# Patient Record
Sex: MALE | Race: WHITE | NOT HISPANIC OR LATINO | Employment: FULL TIME | URBAN - METROPOLITAN AREA
[De-identification: names, ages, dates, MRNs, and addresses within clinical notes are randomized per-mention and may not be internally consistent; named-entity substitution may affect disease eponyms.]

---

## 2017-06-28 ENCOUNTER — OFFICE VISIT (OUTPATIENT)
Dept: FAMILY MEDICINE | Facility: CLINIC | Age: 37
End: 2017-06-28
Payer: COMMERCIAL

## 2017-06-28 VITALS
HEIGHT: 72 IN | SYSTOLIC BLOOD PRESSURE: 137 MMHG | HEART RATE: 73 BPM | DIASTOLIC BLOOD PRESSURE: 82 MMHG | BODY MASS INDEX: 34.95 KG/M2 | OXYGEN SATURATION: 96 % | TEMPERATURE: 98.4 F | WEIGHT: 258 LBS

## 2017-06-28 DIAGNOSIS — S29.012A RHOMBOID MUSCLE STRAIN, INITIAL ENCOUNTER: Primary | ICD-10-CM

## 2017-06-28 PROCEDURE — 99213 OFFICE O/P EST LOW 20 MIN: CPT | Performed by: FAMILY MEDICINE

## 2017-06-28 NOTE — MR AVS SNAPSHOT
After Visit Summary   6/28/2017    Tony Ortega    MRN: [id number]           Patient Information     Date Of Birth          1980        Visit Information        Provider Department      6/28/2017 2:00 PM Sri Granados MD [facility]        Today's Diagnoses     Rhomboid muscle strain, initial encounter    -  1      Care Instructions    Based on your medical history and these are the current health maintenance or preventive care services that you are due for (some may have been done at this visit)  There are no preventive care reminders to display for this patient.      At [facility], we strive to deliver an exceptional experience to you, every time we see you.    If you receive a survey in the mail, please send us back your thoughts. We really do value your feedback.    Your care team's suggested websites for health information:  Www.[facility].org : Up to date and easily searchable information on multiple topics.  Www.medlineplus.gov : medication info, interactive tutorials, watch real surgeries online  Www.familydoctor.org : good info from the Academy of Family Physicians  Www.cdc.gov : public health info, travel advisories, epidemics (H1N1)  Www.aap.org : children's health info, normal development, vaccinations  Www.health.[facility].[place].us : [place] dept of health, public health issues in [place], N1N1    How to contact your care team:   Karsten Clemente/Kole [phone]         Pharmacy [phone]    Dr. May, Nancy Cruz PA-C, Dr. Carranza, Lynn Hawley APRN CNP, Haleigh Hutchinson PA-C, Dr. Hendricks, and GENE Multani CNP    Team RNs: Mai & Renee      Clinic hours  M-Th 7 am-7 pm   Fri 7 am-5 pm.   Urgent care M-F 11 am-9 pm,   Sat/Sun 9 am-5 pm.  Pharmacy M-Th 8 am-8 pm Fri 8 am-6 pm  Sat/Sun 9 am-5 pm.     All password changes, disabled accounts, or ID changes in MyChart/MyHealth will be done by our Access Services  Department.    If you need help with your account or password, call: [phone]. Clinic staff no longer has the ability to change passwords.               Follow-ups after your visit        Who to contact     If you have questions or need follow up information about today's clinic visit or your schedule please contact [facility] directly at [phone].  Normal or non-critical lab and imaging results will be communicated to you by LiveNinjahart, letter or phone within 4 business days after the clinic has received the results. If you do not hear from us within 7 days, please contact the clinic through Lazy Angelt or phone. If you have a critical or abnormal lab result, we will notify you by phone as soon as possible.  Submit refill requests through sendwithus or call your pharmacy and they will forward the refill request to us. Please allow 3 business days for your refill to be completed.          Additional Information About Your Visit        LiveNinjaharDroidhen Information     sendwithus gives you secure access to your electronic health record. If you see a primary care provider, you can also send messages to your care team and make appointments. If you have questions, please call your primary care clinic.  If you do not have a primary care provider, please call [phone] and they will assist you.        Care EveryWhere ID     This is your Care EveryWhere ID. This could be used by other organizations to access your [place] medical records  [id number]        Your Vitals Were     Pulse Temperature Height Pulse Oximetry BMI (Body Mass Index)       73 98.4  F (36.9  C) (Oral) 6' (1.829 m) 96% 34.99 kg/m2        Blood Pressure from Last 3 Encounters:   06/28/17 137/82   09/16/15 134/82   04/23/14 129/81    Weight from Last 3 Encounters:   06/28/17 258 lb (117 kg)   09/16/15 240 lb 12.8 oz (109.2 kg)   04/23/14 232 lb (105.2 kg)              Today, you had the following     No orders found for display         Today's  Medication Changes          These changes are accurate as of: 6/28/17  3:16 PM.  If you have any questions, ask your nurse or doctor.               Start taking these medicines.        Dose/Directions    tiZANidine 4 MG tablet   Commonly known as:  ZANAFLEX   Used for:  Rhomboid muscle strain, initial encounter   Started by:  Sri Granados MD        Dose:  4-8 mg   Take 1-2 tablets (4-8 mg) by mouth 3 times daily as needed for muscle spasms   Quantity:  30 tablet   Refills:  1            Where to get your medicines      These [place] [facility] [place] [address]  [address], [facility] [address]     Phone:  [phone]     tiZANidine 4 MG tablet                Primary Care Provider Office Phone # Fax #    Sri Singer [phone]       [facility] [address]  [facility] [address]        Equal Access to Services     [facility] Foundation HospitalLUCINDA : Hadii aad ku hadasho Soomaali, waaxda luqadaha, qaybta kaalmada adeegyada, waxay idiin haysalma ramos . So [facility] [phone].    ATENCIÓN: Si habla español, tiene a orozco disposición servicios gratuitos de asistencia lingüística. Llame al [phone].    We comply with applicable federal civil rights laws and [place] laws. We do not discriminate on the basis of race, color, national origin, age, disability sex, sexual orientation or gender identity.            Thank you!     Thank you for choosing [facility]  for your care. Our goal is always to provide you with excellent care. Hearing back from our patients is one way we can continue to improve our services. Please take a few minutes to complete the written survey that you may receive in the mail after your visit with us. Thank you!             Your Updated Medication List - Protect others around you: Learn how to safely use, store and throw away your medicines at  www.disposemymeds.org.          This list is accurate as of: 6/28/17  3:16 PM.  Always use your most recent med list.                   Brand Name Dispense Instructions for use Diagnosis    tiZANidine 4 MG tablet    ZANAFLEX    30 tablet    Take 1-2 tablets (4-8 mg) by mouth 3 times daily as needed for muscle spasms    Rhomboid muscle strain, initial encounter

## 2017-06-28 NOTE — PROGRESS NOTES
SUBJECTIVE:                                                    Tony Ortega is a 37 year old male who presents to clinic today for the following health issues:      Joint Pain    Onset: 2 weeks ago    Description:   Location: upper back by shoulder blade  Character: Dull ache    Intensity: mild, 1/10    Progression of Symptoms: intermittent    Accompanying Signs & Symptoms:  Other symptoms: none    History:   Previous similar pain: no    Precipitating factors:   Trauma or overuse: no     Alleviating factors:  Improved by: nothing    Therapies Tried and outcome: na    Can wake from sleep.  Currently works doing desk work.  No recent exercise and no weight lifting.    Problem list and histories reviewed & adjusted, as indicated.  Additional history: as documented    Patient Active Problem List   Diagnosis     Lipid screening     Overweight     Screening for diabetes mellitus     Past Surgical History:   Procedure Laterality Date     EYE SURGERY  2013/04    lasik       Social History   Substance Use Topics     Smoking status: Former Smoker     Packs/day: 1.50     Years: 10.00     Types: Cigarettes     Start date: 8/1/2000     Quit date: 3/9/2012     Smokeless tobacco: Never Used     Alcohol use Yes      Comment: Occasionally     Family History   Problem Relation Age of Onset     DIABETES Paternal Grandfather      Hypertension Paternal Grandfather      DIABETES Maternal Grandfather      Coronary Artery Disease Maternal Grandfather      Hypertension Maternal Grandfather      Colon Cancer Maternal Grandfather      Thyroid Disease Father      Doctor thinks it related to a traumatic headinjury     Hyperlipidemia No family hx of      CEREBROVASCULAR DISEASE No family hx of      Prostate Cancer No family hx of      Depression No family hx of      Anxiety Disorder No family hx of      Thyroid Disease No family hx of      Asthma No family hx of            Reviewed and updated as needed this visit by clinical staff  Tobacco   Allergies  Meds  Med Hx  Surg Hx  Fam Hx  Soc Hx      Reviewed and updated as needed this visit by Provider         ROS:  Constitutional, HEENT, cardiovascular, pulmonary, gi and gu systems are negative, except as otherwise noted.    OBJECTIVE:     /82 (BP Location: Right arm, Patient Position: Chair, Cuff Size: Adult Large)  Pulse 73  Temp 98.4  F (36.9  C) (Oral)  Ht 6' (1.829 m)  Wt 258 lb (117 kg)  SpO2 96%  BMI 34.99 kg/m2  Body mass index is 34.99 kg/(m^2).  GENERAL: healthy, alert and no distress  NECK: no adenopathy, no asymmetry, masses, or scars and thyroid normal to palpation  RESP: lungs clear to auscultation - no rales, rhonchi or wheezes  CV: regular rate and rhythm, normal S1 S2, no S3 or S4, no murmur, click or rub, no peripheral edema and peripheral pulses strong  ABDOMEN: soft, nontender, no hepatosplenomegaly, no masses and bowel sounds normal  MS: right rhomboid muscle tightness  NEURO: Normal strength and tone, mentation intact and speech normal    Diagnostic Test Results:  none     ASSESSMENT/PLAN:     1. Rhomboid muscle strain, initial encounter  Heat, massage and muscle relaxer.  - tiZANidine (ZANAFLEX) 4 MG tablet; Take 1-2 tablets (4-8 mg) by mouth 3 times daily as needed for muscle spasms  Dispense: 30 tablet; Refill: 1    The uses and side effects, including black box warnings as appropriate, were discussed in detail.  All patient questions were answered.  The patient was instructed to call immediately if any side effects developed.     FUTURE APPOINTMENTS:       - Follow-up visit in 1 - 2 weeks if not improved.    Sri Singer MD  [facility]

## 2017-06-28 NOTE — NURSING NOTE
Chief Complaint   Patient presents with     Musculoskeletal Problem       Initial /82 (BP Location: Right arm, Patient Position: Chair, Cuff Size: Adult Large)  Pulse 73  Temp 98.4  F (36.9  C) (Oral)  Ht 6' (1.829 m)  Wt 258 lb (117 kg)  SpO2 96%  BMI 34.99 kg/m2 Estimated body mass index is 34.99 kg/(m^2) as calculated from the following:    Height as of this encounter: 6' (1.829 m).    Weight as of this encounter: 258 lb (117 kg).  Medication Reconciliation: complete   An,CMA (AMAA)

## 2017-06-28 NOTE — PATIENT INSTRUCTIONS
Based on your medical history and these are the current health maintenance or preventive care services that you are due for (some may have been done at this visit)  There are no preventive care reminders to display for this patient.      At [facility], we strive to deliver an exceptional experience to you, every time we see you.    If you receive a survey in the mail, please send us back your thoughts. We really do value your feedback.    Your care team's suggested websites for health information:  Www.[place].org : Up to date and easily searchable information on multiple topics.  Www.medlineplus.gov : medication info, interactive tutorials, watch real surgeries online  Www.familydoctor.org : good info from the Academy of Family Physicians  Www.cdc.gov : public health info, travel advisories, epidemics (H1N1)  Www.aap.org : children's health info, normal development, vaccinations  Www.health.[facility].[place].us : [place] dept of health, public health issues in [place], N1N1    How to contact your care team:   Team Chyna/Spirit [phone]         Pharmacy [phone]    Dr. May, Nancy Cruz PA-C, Dr. Carranza, Lynn MILLS CNP, Haleigh Hutchinson PA-C, Dr. Hendricks, and GENE Multani CNP    Team RNs: Mai Duran      Clinic hours  M-Th 7 am-7 pm   Fri 7 am-5 pm.   Urgent care M-F 11 am-9 pm,   Sat/Sun 9 am-5 pm.  Pharmacy M-Th 8 am-8 pm Fri 8 am-6 pm  Sat/Sun 9 am-5 pm.     All password changes, disabled accounts, or ID changes in [employer]/MyHealth will be done by our Access Services Department.    If you need help with your account or password, call: [phone]. Clinic staff no longer has the ability to change passwords.

## 2018-05-17 ENCOUNTER — OFFICE VISIT (OUTPATIENT)
Dept: URGENT CARE | Facility: URGENT CARE | Age: 38
End: 2018-05-17
Payer: COMMERCIAL

## 2018-05-17 ENCOUNTER — RADIANT APPOINTMENT (OUTPATIENT)
Dept: GENERAL RADIOLOGY | Facility: CLINIC | Age: 38
End: 2018-05-17
Attending: PHYSICIAN ASSISTANT
Payer: COMMERCIAL

## 2018-05-17 VITALS
DIASTOLIC BLOOD PRESSURE: 91 MMHG | TEMPERATURE: 98.5 F | SYSTOLIC BLOOD PRESSURE: 165 MMHG | WEIGHT: 251.4 LBS | OXYGEN SATURATION: 97 % | HEART RATE: 65 BPM | BODY MASS INDEX: 34.1 KG/M2

## 2018-05-17 DIAGNOSIS — M25.532 LEFT WRIST PAIN: ICD-10-CM

## 2018-05-17 DIAGNOSIS — S60.212A CONTUSION OF LEFT WRIST, INITIAL ENCOUNTER: ICD-10-CM

## 2018-05-17 DIAGNOSIS — M25.532 LEFT WRIST PAIN: Primary | ICD-10-CM

## 2018-05-17 PROCEDURE — 99213 OFFICE O/P EST LOW 20 MIN: CPT | Performed by: PHYSICIAN ASSISTANT

## 2018-05-17 PROCEDURE — 73110 X-RAY EXAM OF WRIST: CPT | Mod: LT

## 2018-05-17 ASSESSMENT — ENCOUNTER SYMPTOMS
MYALGIAS: 1
NECK STIFFNESS: 0
DIAPHORESIS: 0
COUGH: 0
ARTHRALGIAS: 1
CHILLS: 0
RHINORRHEA: 0
SORE THROAT: 0
DIZZINESS: 0
VOMITING: 0
NECK PAIN: 0
SHORTNESS OF BREATH: 0
HEADACHES: 0
NAUSEA: 0
LIGHT-HEADEDNESS: 0
DIARRHEA: 0
WOUND: 0
FEVER: 0
WEAKNESS: 0
ADENOPATHY: 0
BRUISES/BLEEDS EASILY: 0

## 2018-05-17 NOTE — MR AVS SNAPSHOT
After Visit Summary   5/17/2018    Tony Ortega    MRN: [id number]           Patient Information     Date Of Birth          1980        Visit Information        Provider Department      5/17/2018 4:30 PM Jaleel Gary PA-C [facility]        Today's Diagnoses     Left wrist pain    -  1    Contusion of left wrist, initial encounter           Follow-ups after your visit        Who to contact     If you have questions or need follow up information about today's clinic visit or your schedule please contact [facility] directly at [phone].  Normal or non-critical lab and imaging results will be communicated to you by Biarthart, letter or phone within 4 business days after the clinic has received the results. If you do not hear from us within 7 days, please contact the clinic through Biarthart or phone. If you have a critical or abnormal lab result, we will notify you by phone as soon as possible.  Submit refill requests through Molcure or call your pharmacy and they will forward the refill request to us. Please allow 3 business days for your refill to be completed.          Additional Information About Your Visit        MyChart Information     Molcure gives you secure access to your electronic health record. If you see a primary care provider, you can also send messages to your care team and make appointments. If you have questions, please call your primary care clinic.  If you do not have a primary care provider, please call [phone] and they will assist you.        Care EveryWhere ID     This is your Care EveryWhere ID. This could be used by other organizations to access your [place] medical records  [id number]        Your Vitals Were     Pulse Temperature Pulse Oximetry BMI (Body Mass Index)          65 98.5  F (36.9  C) (Oral) 97% 34.1 kg/m2         Blood Pressure from Last 3 Encounters:   05/17/18 (!) 165/91   06/28/17 137/82   09/16/15  134/82    Weight from Last 3 Encounters:   05/17/18 251 lb 6.4 oz (114 kg)   06/28/17 258 lb (117 kg)   09/16/15 240 lb 12.8 oz (109.2 kg)               Primary Care Provider Office Phone # Fax [place] Tevin Singer [phone]       [address]  [facility] [address]        Equal Access to Services     PASQUALE DE LA ROSA : Hadii aad ku hadasho Soomaali, waaxda luqadaha, qaybta kaalmada adeegyada, waxay idiin hayaan adeeg kharash la'aan . So [facility] [phone].    ATENCIÓN: Si habla español, tiene a orozco disposición servicios gratuitos de asistencia lingüística. [facility] [phone].    We comply with applicable federal civil rights laws and [place] laws. We do not discriminate on the basis of race, color, national origin, age, disability, sex, sexual orientation, or gender identity.            Thank you!     Thank you for choosing [facility]  for your care. Our goal is always to provide you with excellent care. Hearing back from our patients is one way we can continue to improve our services. Please take a few minutes to complete the written survey that you may receive in the mail after your visit with us. Thank you!             Your Updated Medication List - Protect others around you: Learn how to safely use, store and throw away your medicines at www.disposemymeds.org.          This list is accurate as of 5/17/18  6:05 PM.  Always use your most recent med list.                   Brand Name Dispense Instructions for use Diagnosis    tiZANidine 4 MG tablet    ZANAFLEX    30 tablet    Take 1-2 tablets (4-8 mg) by mouth 3 times daily as needed for muscle spasms    Rhomboid muscle strain, initial encounter

## 2018-05-17 NOTE — PROGRESS NOTES
Chief Complaint:    Chief Complaint   Patient presents with     Pain     Patient complains of pain in left wrist and arm        HPI:    Tony Ortega is a 38 year old male who sustained a left wrist injury 1 days ago. Mechanism of injury: Patient was doing some housework and felt a sharp pain on the palmar side of the L wrist.  This morning he woke up and noticed some bruising in this area.  Symptoms have been waxing and waning since that time. Prior history of related problems: no prior problems with this area in the past.    ROS:     Review of Systems   Constitutional: Negative for chills, diaphoresis and fever.   HENT: Negative for congestion, ear pain, rhinorrhea and sore throat.    Respiratory: Negative for cough and shortness of breath.    Gastrointestinal: Negative for diarrhea, nausea and vomiting.   Musculoskeletal: Positive for arthralgias and myalgias. Negative for neck pain and neck stiffness.   Skin: Negative for wound.   Allergic/Immunologic: Negative for immunocompromised state.   Neurological: Negative for dizziness, weakness, light-headedness and headaches.   Hematological: Negative for adenopathy. Does not bruise/bleed easily.         Family History   Family History   Problem Relation Age of Onset     DIABETES Paternal Grandfather      Hypertension Paternal Grandfather      DIABETES Maternal Grandfather      Coronary Artery Disease Maternal Grandfather      Hypertension Maternal Grandfather      Colon Cancer Maternal Grandfather      Thyroid Disease Father      Doctor thinks it related to a traumatic headinjury     Hyperlipidemia No family hx of      CEREBROVASCULAR DISEASE No family hx of      Prostate Cancer No family hx of      Depression No family hx of      Anxiety Disorder No family hx of      Thyroid Disease No family hx of      Asthma No family hx of         Problem history  Patient Active Problem List   Diagnosis     Lipid screening     Overweight     Screening for diabetes mellitus         Allergies  No Known Allergies     Social History  Social History     Social History     Marital status: Single     Spouse name: N/A     Number of children: N/A     Years of education: N/A     Occupational History     Not on file.     Social History Main Topics     Smoking status: Former Smoker     Packs/day: 1.50     Years: 10.00     Types: Cigarettes     Start date: 8/1/2000     Quit date: 3/9/2012     Smokeless tobacco: Never Used     Alcohol use Yes      Comment: Occasionally     Drug use: No     Sexual activity: Yes     Partners: Female     Birth control/ protection: None     Other Topics Concern     Parent/Sibling W/ Cabg, Mi Or Angioplasty Before 65f 55m? No     Social History Narrative        Current Meds    Current Outpatient Prescriptions:      tiZANidine (ZANAFLEX) 4 MG tablet, Take 1-2 tablets (4-8 mg) by mouth 3 times daily as needed for muscle spasms (Patient not taking: Reported on 5/17/2018), Disp: 30 tablet, Rfl: 1     Physical Exam:     Vital signs were reviewed and noted by me.  BP (!) 165/91 (BP Location: Left arm, Patient Position: Chair, Cuff Size: Adult Regular)  Pulse 65  Temp 98.5  F (36.9  C) (Oral)  Wt 251 lb 6.4 oz (114 kg)  SpO2 97%  BMI 34.1 kg/m2    General appearance: healthy, alert and no distress  Ears: R TM - normal: no effusions, no erythema, and normal landmarks, L TM - normal: no effusions, no erythema, and normal landmarks  Eyes: R normal, L normal  Nose: normal  Oropharynx: normal  Neck: supple and no adenopathy  Lungs: normal and clear to auscultation  Heart: S1, S2 normal, no murmur, click, rub or gallop, regular rate and rhythm  Hand and wrist exam: soft tissue tenderness and swelling at the palmar side of the wrist, 2 cm in diameter area of ecchymosis, radial pulse normal, sensation normal.  Full range of motion of wrist and all digits.  Wrist strength 5/5, digits neurologically intact.      X-ray per radiology read:     WRIST LEFT THREE OR MORE VIEWS     5/17/2018 5:20 PM      HISTORY: Left wrist bruising. No acute injury. Left wrist pain.     COMPARISON: None.         IMPRESSION: No evidence of acute fracture or subluxation.     MAIY SALTER MD     ASSESSMENT:     (M25.532) Left wrist pain  (primary encounter diagnosis)  Plan: XR Wrist Left G/E 3 Views    (S60.493A) Contusion of left wrist, initial encounter     PLAN:  Discussed imaging with patient.  XR of the wrist was negative for any acute fracture.  Advised ice to the area.  Ibuprofen for comfort.  Patient given ace wrap for support.  Patient instructed to follow up with his PCP in 2 weeks if symptoms have not resolved.  Earlier if symptoms worsen.  Patient verbalized understanding and agreed with this plan.     Jaleel Gary  5/17/2018, 4:45 PM

## 2018-05-29 ENCOUNTER — OFFICE VISIT (OUTPATIENT)
Dept: FAMILY MEDICINE | Facility: CLINIC | Age: 38
End: 2018-05-29
Payer: COMMERCIAL

## 2018-05-29 VITALS
TEMPERATURE: 98.5 F | OXYGEN SATURATION: 97 % | BODY MASS INDEX: 34.27 KG/M2 | HEART RATE: 65 BPM | DIASTOLIC BLOOD PRESSURE: 86 MMHG | HEIGHT: 72 IN | WEIGHT: 253 LBS | SYSTOLIC BLOOD PRESSURE: 138 MMHG

## 2018-05-29 DIAGNOSIS — M54.2 CERVICALGIA: ICD-10-CM

## 2018-05-29 DIAGNOSIS — M89.8X1 PAIN OF LEFT SCAPULA: Primary | ICD-10-CM

## 2018-05-29 DIAGNOSIS — M79.602 PAIN OF LEFT UPPER EXTREMITY: ICD-10-CM

## 2018-05-29 PROCEDURE — 99207 ZZC FOR CODING REVIEW: CPT | Performed by: FAMILY MEDICINE

## 2018-05-29 PROCEDURE — 99214 OFFICE O/P EST MOD 30 MIN: CPT | Performed by: FAMILY MEDICINE

## 2018-05-29 ASSESSMENT — PAIN SCALES - GENERAL: PAINLEVEL: MILD PAIN (3)

## 2018-05-29 NOTE — PROGRESS NOTES
"  SUBJECTIVE:   Tony Ortega is a 38 year old male who presents to clinic today for the following health issues:      Joint Pain    Onset: about 2 weeks ago (day or so after his wrist injury on 5/16/18, injured when doing housework, seen at )    Description:   Location: shoulder blade, into left shoulder, down arm, and into left wrist  Character: Dull ache  \"knot\" at base of shoulder blade that is not painful (massaging actually relives discomfort/feels good)  Slight left-sided neck pain  Majority of pain in upper left arm    Intensity: moderate    Progression of Symptoms: worse    Accompanying Signs & Symptoms:  Other symptoms: radiation of pain to elbow    History:   Previous similar pain: no       Precipitating factors:   Trauma or overuse: no     Alleviating factors:  Improved by: nothing    Therapies Tried and outcome: Aleve & Heat. Heat seems to work well for 10-15 minutes.    Saw provider in  on 5/17/18 for similar issue (just wrist pain present at that appointment).  provider thought a mass on his wrist was a ganglion cyst.    Past medical, family, and social histories, medications, and allergies are reviewed and updated in [facility].     ROS:  CONSTITUTIONAL: NEGATIVE for fever, chills, change in weight  ENT/MOUTH: NEGATIVE for ear, mouth and throat problems  RESP: NEGATIVE for significant cough or SOB  CV: NEGATIVE for chest pain, palpitations or peripheral edema  ROS otherwise negative    This document serves as a record of the services and decisions personally performed and made by Dr. Vick. It was created on his behalf by Dyan Broussard, a trained medical scribe. The creation of this document is based the provider's statements to the medical scribe.  Dyan Broussard May 29, 2018 4:59 PM     OBJECTIVE:                                                    /86 (BP Location: Left arm, Patient Position: Chair, Cuff Size: Adult Large)  Pulse 65  Temp 98.5  F (36.9  C) (Oral)  Ht 1.829 m (6') "  Wt 114.8 kg (253 lb)  SpO2 97%  BMI 34.31 kg/m2   Body mass index is 34.31 kg/(m^2).     GENERAL: healthy, alert and no distress  EYES: Eyes grossly normal to inspection, PERRL, EOMI, sclerae white and conjunctivae normal  MS: Mass or prominence over the inferior portion of the left scapula. No focal tenderness there or anywhere around the left shoulder joint. Full ROM of the neck and left shoulder.  SKIN: no suspicious lesions or rashes.   NEURO: Normal strength and tone, sensory exam grossly normal, mentation intact, oriented times 3 and cranial nerves 2-12 intact  PSYCH: mentation appears normal, affect normal/bright     Diagnostic Test Results:  none      ASSESSMENT/PLAN:                                                      (M89.8X1) Pain of left scapula  (primary encounter diagnosis)  (M79.602) Pain of left upper extremity  (M54.2) Cervicalgia  Comment: In the area of the left scapula, there may be muscle spasm, lipoma, other mass, or just prominence of his muscle and fat. Since he describes symptoms originating from there, I think the area should be imaged. If these 2 scans are negative, would consider the possibility of him experiencing cervical radicular pain with only minimal neck pain.   Plan: MR Scapula Left w/o Contrast, ORTHO          REFERRAL, MR Shoulder Left w/o Contrast        Handouts provided.       The information in this document, created by the medical scribe for me, accurately reflects the services I personally performed and the decisions made by me. I have reviewed and approved this document for accuracy prior to leaving the patient care area. May 29, 2018 4:59 PM     Cullen Vick MD

## 2018-05-29 NOTE — MR AVS SNAPSHOT
After Visit Summary   5/29/2018    Tony Ortega    MRN: [id number]           Patient Information     Date Of Birth          1980        Visit Information        Provider Department      5/29/2018 4:20 PM Cullen Vick MD [facility]        Today's Diagnoses     Pain of left scapula    -  1    Pain of left upper extremity        Cervicalgia          Care Instructions    At [facility], we strive to deliver an exceptional experience to you, every time we see you.  If you receive a survey in the mail, please send us back your thoughts. We really do value your feedback.    Based on your medical history, these are the current health maintenance/preventive care services that you are due for (some may have been done at this visit.)  Health Maintenance Due   Topic Date Due     HIV SCREEN (SYSTEM ASSIGNED)  03/04/1999       Suggested websites for health information:  Www.[employer] : Up to date and easily searchable information on multiple topics.  Www.[employer].gov : medication info, interactive tutorials, watch real surgeries online  Www.familydoctor.org : good info from the Academy of Family Physicians  Www.cdc.gov : public health info, travel advisories, epidemics (H1N1)  Www.aap.org : children's health info, normal development, vaccinations  Www.health.state.[place].us : [place] dept of health, public health issues in [place], N1N1    Your care team:                            Family Medicine Internal Medicine   MD William Tsai MD Shantel Branch-Fleming, MD Katya Georgiev PA-C Megan Hill, GENE Best MD Pediatrics   ONEIDA Brizuela, MD Lynn Solis APRN CNP   MD Betzaida Gongora MD Deborah Mielke, MD Kim Thein, APRN [facility]      Clinic hours: Monday - Thursday 7 am-7 pm; Fridays 7 am-5 pm.   Urgent care: Monday - Friday 11 am-9 pm; Saturday and Sunday 9 am-5 pm.  Pharmacy :  Monday -Thursday 8 am-8 pm; Friday 8 am-6 pm; Saturday and Sunday 9 am-5 pm.     Clinic: [phone]   Pharmacy: [phone]      Ganglion Cyst: Hand    A ganglion cyst is a firm, fluid-filled lump that can suddenly appear on the front or back of the wrist or at the base of a finger. These cysts grow from normal tissue in the wrist and fingers, and range in size from a pea to a peach pit. Although ganglion cysts are common, they don t spread, and they don t become cancerous. They can occur after an injury, but many times it isn t known why they grow. Ganglion cysts can change in size, and may go away on their own.  What are the symptoms of a ganglion cyst?  A ganglion cyst is sometimes painful, especially when it first occurs. Constantly using your hand or wrist can make the cyst enlarge and hurt more. Some hand and wrist movements, such as grasping things, may also be difficult.  How does a ganglion cyst develop?  Your wrist and hand are made up of many small bones that meet at joints. Tendons attach muscles to the bones at the joints. The tendons allow the joints to bend and straighten. Both tendons and joints are lined with tissue called synovium. This tissue makes a thick fluid that keeps the joints and tendons moving easily. Sometimes the tissue balloons out from the joint or tendons and forms a cyst. As the cyst fills with fluid and grows, it appears as a lump you can feel.  Where do ganglion cysts occur?  A ganglion cyst can occur anywhere on the hand near a joint. Cysts most commonly appear on the back or palm side of the wrist, or on the palm at the base of a finger. Your doctor can usually diagnose a cyst by examining the lump. He or she may draw off a little fluid or order an X-ray to rule out other problems.  How is a ganglion cyst treated?  Your healthcare provider may just watch your ganglion cyst. Many shrink and become painless without treatment. Some disappear altogether. If the cyst is  unsightly or painful, or makes it hard for you to use your hand, your healthcare provider can treat it or, if needed, remove it surgically.  Nonsurgical treatment  To shrink the cyst, your provider may remove (aspirate) the fluid with a needle. If the cyst hurts, your provider may also give you an injection of an anti-inflammatory, such as cortisone, to relieve the irritation. Your hand may then be wrapped to help keep the cyst from recurring.  Surgery  If the cyst reappears after treatment, your healthcare provider may remove it surgically. A section of the tissue that lines the joint or tendon is removed along with the cyst. This helps prevent another cyst from forming, although recurrence of the cyst is still possible after surgery. Usually, only your hand or arm is numbed, and you can go home a few hours after surgery. Your hand may be in a splint for several days.  Date Last Reviewed: 9/1/2017 2000-2017 [employer]. [address] [place] [address]. All rights reserved. This information is not intended as a substitute for professional medical care. Always follow your healthcare professional's instructions.        Understanding Rotator Cuff Tendonitis    Tendons are tough tissues that connect muscles to bone. A group of 4 muscles and their tendons form a  cuff  around the head of the upper arm bone. This is called the rotator cuff. It connects the upper arm to the shoulder blade. It gives the shoulder joint stability and strength.  If tendons are injured or strained, they may become irritated and swollen (inflamed). This is called tendonitis. Rotator cuff tendonitis may cause shoulder pain and loss of function.  What causes rotator cuff tendonitis?  Tendonitis results when the rotator cuff tendons are injured or overworked. The most common cause of injury is repetitive overhead activities. These can be work-related activities such as reaching, pushing, or lifting. Or they can be  sports-related activities such as throwing, swimming, or lifting weights.  Symptoms of rotator cuff tendonitis  Pain on the side of the upper arm is the most common symptom. Pain may get worse with overhead movements or when you raise the arm above shoulder level. It may also hurt to lie on the shoulder at night.  Treatment for rotator cuff tendonitis  Treatment may include the following:    Active rest. This lets the rotator cuff heal. Active rest means using your arm and shoulder, but avoiding activities that cause pain, such as reaching overhead or sleeping on the shoulder.    Cold packs. Putting ice packs on the shoulder helps reduce swelling and relieve pain.    Pain medicines. Prescription or over-the-counter pain medicines can help relieve pain and swelling.    Arm and shoulder exercises. These help keep the shoulder joint mobile as it heals. They also help improve the strength of muscles around the joint.  Possible complications  It might be tempting to stop using your shoulder completely to avoid pain. But doing so may lead to a condition called  frozen shoulder.  To help prevent this, following instructions you are given for active rest and for doing exercises to help your shoulder heal.  When to call your healthcare provider  Call your healthcare provider right away if you have any of these:    Fever of 100.4 F (38 C) or higher, or as directed    Symptoms that don t get better, or get worse    New symptoms   Date Last Reviewed: 3/10/2016    5426-3150 [employer]. [address] [place] [address]. All rights reserved. This information is not intended as a substitute for professional medical care. Always follow your healthcare professional's instructions.        Understanding Cervical Radiculopathy    Cervical radiculopathy is irritation or inflammation of a nerve root in the neck. It causes neck pain and other symptoms that may spread into the chest or down the arm. To understand  this condition, it helps to understand the parts of the spine:    Vertebrae. These are bones that stack to form the spine. The cervical spine contains the 7 vertebrae in the neck.    Disks. These are soft pads of tissue between the vertebrae. They act as shock absorbers for the spine.    The spinal canal. This is a tunnel formed within the stacked vertebrae. The spinal cord runs through this canal.    Nerves. These branch off the spinal cord. As they leave the spinal canal, nerves pass through openings between the vertebrae. The nerve root is the part of the nerve that is closest to the spinal cord.   With cervical radiculopathy, nerve roots in the neck become irritated. This leads to pain and symptoms that can travel to the nerves that go from the spinal cord down the arms and into the torso.  What causes cervical radiculopathy?  Aging, injury, poor posture, and other issues can lead to problems in the neck. These problems may then irritate nerve roots. These include:    Damage to a disk in the cervical spine. The damaged disk may then press on nearby nerve roots.    Degeneration from wear and tear, and aging. This can lead to narrowing (stenosis) of the openings between the vertebrae. The narrowed openings press on nerve roots as they leave the spinal canal.    An unstable spine. This is when a vertebra slips forward. It can then press on a nerve root.  There are other, less common causes of pressure on nerves in the neck. These include infection, cysts, and tumors.  Symptoms of cervical radiculopathy  These include:    Neck pain    Pain, numbness, tingling, or weakness that travels down the arm    Loss of neck movement    Muscle spasms  Treatment for cervical radiculopathy  In most cases, your healthcare provider will first try treatments that help relieve symptoms. These may include:    Prescription or over-the-counter pain medicines. These help relieve pain and swelling.    Cold packs. These help reduce  pain.    Resting. This involves avoiding positions and activities that increase pain.    Neck brace (cervical collar). This can help relieve inflammation and pain.    Physical therapy, including exercises and stretches. This can help decrease pain and increase movement and function.    Shots of medicinesaround the nerve roots. This is done to help relieve symptoms for a time.  In some cases, your healthcare provider may advise surgery to fix the underlying problem. This depends on the cause, the symptoms, and how long the pain has lasted.  Possible complications  Over time, an irritated and inflamed nerve may become damaged. This may lead to long-lasting (permanent) numbness or weakness. If symptoms change suddenly or get worse, be sure to let your healthcare provider know.     When to call your healthcare provider  Call your healthcare provider right away if you have any of these:    New pain or pain that gets worse    New or increasing weakness, numbness, or tingling in your arm or hand    Bowel or bladder changes   Date Last Reviewed: 3/10/2016    3500-1075 [employer]. [address] [place] [address]. All rights reserved. This information is not intended as a substitute for professional medical care. Always follow your healthcare professional's instructions.      Please call the [facility]  [phone] to schedule your left shoulder and left scapula MRI.           Follow-ups after your visit        Additional Services     ORTHO  REFERRAL       [facility] is referring you to the Orthopedic  Services at [facility].       The  Representative will assist you in the coordination of your Orthopedic and Musculoskeletal Care as prescribed by your physician.    The  Representative will call you within 1 business day to help schedule your appointment, or you may contact the   Representative at:    All areas ~ [phone]     Type of Referral : Surgical / Specialist       Timeframe requested: Within 2 weeks    Coverage of these services is subject to the terms and limitations of your health insurance plan.  Please call member services at your health plan with any benefit or coverage questions.      If X-rays, CT or MRI's have been performed, please contact the facility where they were done to arrange for , prior to your scheduled appointment.  Please bring this referral request to your appointment and present it to your specialist.                  Future tests that were ordered for you today     Open Future Orders        Priority Expected Expires Ordered    MR Shoulder Left w/o Contrast Routine  5/29/2019 5/29/2018    MR Scapula Left w/o Contrast Routine  5/29/2019 5/29/2018            Who to contact     If you have questions or need follow up information about today's clinic visit or your schedule please contact [facility] directly at [phone].  Normal or non-critical lab and imaging results will be communicated to you by enModushart, letter or phone within 4 business days after the clinic has received the results. If you do not hear from us within 7 days, please contact the clinic through [employer] or phone. If you have a critical or abnormal lab result, we will notify you by phone as soon as possible.  Submit refill requests through [employer] or call your pharmacy and they will forward the refill request to us. Please allow 3 business days for your refill to be completed.          Additional Information About Your Visit        [employer]ar[employer]a Information     [employer] gives you secure access to your electronic health record. If you see a primary care provider, you can also send messages to your care team and make appointments. If you have questions, please call your primary care clinic.  If you do not have a primary care provider, please call [phone] and they will  assist you.        Care EveryWhere ID     This is your Care EveryWhere ID. This could be used by other organizations to access your [facility] medical records  [id number]        Your Vitals Were     Pulse Temperature Height Pulse Oximetry BMI (Body Mass Index)       65 98.5  F (36.9  C) (Oral) 1.829 m (6') 97% 34.31 kg/m2        Blood Pressure from Last 3 Encounters:   05/29/18 138/86   05/17/18 (!) 165/91   06/28/17 137/82    Weight from Last 3 Encounters:   05/29/18 114.8 kg (253 lb)   05/17/18 114 kg (251 lb 6.4 oz)   06/28/17 117 kg (258 lb)              We Performed the Following     ORTHO  REFERRAL        Primary Care Provider Office Phone # Fax #    Sri Estrella Singer [phone]       [address] [facility] [address]        Equal Access to Services     [facility]: Hadii aad ku hadasho Soomaali, waaxda luqadaha, qaybta kaalmada adeegyada, waxay idiin hayaan adeeg kharash la'bernardinon . So [facility] [phone].    ATENCIÓN: Si habla español, tiene a orozco disposición servicios gratuitos de asistencia lingüística. Llame al [phone].    We comply with applicable federal civil rights laws and [place] laws. We do not discriminate on the basis of race, color, national origin, age, disability, sex, sexual orientation, or gender identity.            Thank you!     Thank you for choosing [facility]  for your care. Our goal is always to provide you with excellent care. Hearing back from our patients is one way we can continue to improve our services. Please take a few minutes to complete the written survey that you may receive in the mail after your visit with us. Thank you!             Your Updated Medication List - Protect others around you: Learn how to safely use, store and throw away your medicines at www.disposemymeds.org.      Notice  As of 5/29/2018  5:22 PM    You have not been prescribed any medications.

## 2018-06-02 ENCOUNTER — RADIANT APPOINTMENT (OUTPATIENT)
Dept: MRI IMAGING | Facility: CLINIC | Age: 38
End: 2018-06-02
Attending: FAMILY MEDICINE
Payer: COMMERCIAL

## 2018-06-02 DIAGNOSIS — M79.602 PAIN OF LEFT UPPER EXTREMITY: ICD-10-CM

## 2018-06-02 DIAGNOSIS — M89.8X1 PAIN OF LEFT SCAPULA: ICD-10-CM

## 2018-06-02 PROCEDURE — 73221 MRI JOINT UPR EXTREM W/O DYE: CPT | Mod: TC

## 2018-06-02 PROCEDURE — 73218 MRI UPPER EXTREMITY W/O DYE: CPT | Mod: TC

## 2018-06-05 ENCOUNTER — OFFICE VISIT (OUTPATIENT)
Dept: ORTHOPEDICS | Facility: CLINIC | Age: 38
End: 2018-06-05
Payer: COMMERCIAL

## 2018-06-05 VITALS
WEIGHT: 250 LBS | DIASTOLIC BLOOD PRESSURE: 92 MMHG | RESPIRATION RATE: 15 BRPM | HEIGHT: 72 IN | BODY MASS INDEX: 33.86 KG/M2 | SYSTOLIC BLOOD PRESSURE: 148 MMHG

## 2018-06-05 DIAGNOSIS — M25.512 CHRONIC PERISCAPULAR PAIN ON LEFT SIDE: Primary | ICD-10-CM

## 2018-06-05 DIAGNOSIS — M54.2 CERVICALGIA: ICD-10-CM

## 2018-06-05 DIAGNOSIS — G89.29 CHRONIC PERISCAPULAR PAIN ON LEFT SIDE: Primary | ICD-10-CM

## 2018-06-05 DIAGNOSIS — M25.512 LEFT SHOULDER PAIN, UNSPECIFIED CHRONICITY: ICD-10-CM

## 2018-06-05 PROCEDURE — 99243 OFF/OP CNSLTJ NEW/EST LOW 30: CPT | Performed by: ORTHOPAEDIC SURGERY

## 2018-06-05 NOTE — MR AVS SNAPSHOT
After Visit Summary   6/5/2018    Tony Ortega    MRN: [id number]           Patient Information     Date Of Birth          1980        Visit Information        Provider Department      6/5/2018 8:15 AM Jose Hernandez MD [facility]        Today's Diagnoses     Chronic periscapular pain on left side    -  1    Left shoulder pain, unspecified chronicity           Follow-ups after your visit        Additional Services     [facility], [facility] CH[facility]RACT[facility] REFERRAL       **This order will print in the [facility] Scheduling Office**    Physical Therapy, Hand Therapy and Chiropractic Care are available through:    *[facility]  *[facility]  *[facility]    Call one number to schedule at any of the above locations: [phone].    Your provider has referred you to: Physical Therapy at [facility] or [facility]    Indication/Reason for Referral: Left shoulder/neck pain  Onset of Illness:   Therapy Orders: Evaluate and Treat  Special Programs: None  Special Request: 1-2 times weekly for 4-6 weeks    Radha Garcia      Additional Comments for the Therapist or Chiropractor: neck posture, strengthening, stretching    Please be aware that coverage of these services is subject to the terms and limitations of your health insurance plan.  Call member services at your health plan with any benefit or coverage questions.      Please bring the following to your appointment:    *Your personal calendar for scheduling future appointments  *Comfortable clothing                  Who to contact     If you have questions or need follow up information about today's clinic visit or your schedule please contact [facility] directly at [phone].  Normal or non-critical lab and imaging results will be communicated to you by MyChart, letter or phone within 4 business days after the clinic has received the results. If you do not hear from us  within 7 days, please contact the clinic through [employer] or phone. If you have a critical or abnormal lab result, we will notify you by phone as soon as possible.  Submit refill requests through [employer] or call your pharmacy and they will forward the refill request to us. Please allow 3 business days for your refill to be completed.          Additional Information About Your Visit        [employer] Information     [employer] gives you secure access to your electronic health record. If you see a primary care provider, you can also send messages to your care team and make appointments. If you have questions, please call your primary care clinic.  If you do not have a primary care provider, please call [phone] and they will assist you.        Care EveryWhere ID     This is your Care EveryWhere ID. This could be used by other organizations to access your [facility] medical records  [id number]        Your Vitals Were     Respirations Height BMI (Body Mass Index)             15 1.829 m (6') 33.91 kg/m2          Blood Pressure from Last 3 Encounters:   06/05/18 (!) 148/92   05/29/18 138/86   05/17/18 (!) 165/91    Weight from Last 3 Encounters:   06/05/18 113.4 kg (250 lb)   05/29/18 114.8 kg (253 lb)   05/17/18 114 kg (251 lb 6.4 oz)              We Performed the Following     [facility], AND CHIR[facility]RACTIC REFERRAL        Primary Care Provider Office Phone # Fax #    Sri Estrella Singer [phone]       [address]  [facility] [address]        Equal Access to Services     [facility]: Hadii aad ku hadasho Soomaali, waaxda luqadaha, qaybta kaalmada adeegyada, aiden ramos . So [facility] [phone].    ATENCIÓN: Si habla español, tiene a orozco disposición servicios gratuitos de asistencia lingüística. Llame al [phone].    We comply with applicable federal civil rights laws and [place] laws. We do not discriminate on the basis of race, color, national  origin, age, disability, sex, sexual orientation, or gender identity.            Thank you!     Thank you for choosing [facility]  for your care. Our goal is always to provide you with excellent care. Hearing back from our patients is one way we can continue to improve our services. Please take a few minutes to complete the written survey that you may receive in the mail after your visit with us. Thank you!             Your Updated Medication List - Protect others around you: Learn how to safely use, store and throw away your medicines at www.disposemymeds.org.      Notice  As of 6/5/2018  8:37 AM    You have not been prescribed any medications.

## 2018-06-05 NOTE — PROGRESS NOTES
Tony Ortega is a 38 year old male who is seen in consultation at the request of Dr. Cullen Vick  for left neck and arm pain.  He has about a one-month history of dull aching pain extending to left neck and shoulder across the shoulder blade and down to the elbow. Does not cross down into the forearm. He does not know a specific cause but does do a fair amount of leaning on the arm leaning toward the left side. He has a friend who is had a neck fusion recently and reported that many of his symptoms were the same. He has dull aching pain rated 3 out of 10. Tylenol does help    He has had MRI scan of the shoulder and the scapula there is no rotator cuff tear minimal subacromial bursal mild acromio-clavicular . 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 MRI of the neck has not been performed.    Past Medical History:   Diagnosis Date     Overweight 9/16/2015     Problem list name updated by automated process. Provider to review       Past Surgical History:   Procedure Laterality Date     EYE SURGERY  2013/04    lasik       Family History   Problem Relation Age of Onset     DIABETES Paternal Grandfather      Hypertension Paternal Grandfather      DIABETES Maternal Grandfather      Coronary Artery Disease Maternal Grandfather      Hypertension Maternal Grandfather      Colon Cancer Maternal Grandfather      Thyroid Disease Father      Doctor thinks it related to a traumatic headinjury     Hyperlipidemia No family hx of      CEREBROVASCULAR DISEASE No family hx of      Prostate Cancer No family hx of      Depression No family hx of      Anxiety Disorder No family hx of      Thyroid Disease No family hx of      Asthma No family hx of        Social History     Social History     Marital status: Single     Spouse name: N/A     Number of children: N/A     Years of education: N/A     Occupational History     Not on file.     Social History Main Topics     Smoking status: Former  Smoker     Packs/day: 1.50     Years: 10.00     Types: Cigarettes     Start date: 8/1/2000     Quit date: 3/9/2012     Smokeless tobacco: Never Used     Alcohol use Yes      Comment: Occasionally     Drug use: No     Sexual activity: Yes     Partners: Female     Birth control/ protection: None     Other Topics Concern     Parent/Sibling W/ Cabg, Mi Or Angioplasty Before 65f 55m? No     Social History Narrative       No current outpatient prescriptions on file.       No Known Allergies    REVIEW OF SYSTEMS:  CONSTITUTIONAL:  NEGATIVE for fever, chills, change in weight, not feeling tired  SKIN:  NEGATIVE for worrisome rashes, no skin lumps, no skin ulcers and no non-healing wounds  EYES:  NEGATIVE for vision changes or irritation.  ENT/MOUTH:  NEGATIVE.  No hearing loss, no hoarseness, no difficulty swallowing.  RESP:  NEGATIVE. No cough or shortness of breath.  CV:  NEGATIVE for chest pain, palpitations or peripheral edema  GI:  NEGATIVE for nausea, abdominal pain, heartburn, or change in bowel habits  :  Negative. No dysuria, no hematuria  MUSCULOSKELETAL:  See HPI above  NEURO:  NEGATIVE . No headaches, no dizziness,  no numbness  ENDOCRINE:  NEGATIVE for temperature intolerance, skin/hair changes  HEME/ALLERGY/IMMUNE:  NEGATIVE for bleeding problems  PSYCHIATRIC:  NEGATIVE. no anxiety, no depression.     Exam:  Vitals: BP (!) 148/92 (BP Location: Left arm)  Resp 15  Ht 1.829 m (6')  Wt 113.4 kg (250 lb)  BMI 33.91 kg/m2  BMI= Body mass index is 33.91 kg/(m^2).  Constitutional:  healthy, alert and no distress  Neuro: Alert and Oriented x 3, Gait normal. Sensation grossly WNL.  Psych: Affect normal   Respiratory: Breathing not labored.  Cardiovascular: normal peripheral pulses  Lymph: no adenopathy  Skin: No rashes,worrisome lesions or skin problems  He has full range of motion of the neck. Full extension of the neck does produce pain into the left side of the neck and shoulder.  He has full range of motion  of the shoulders without pain.  He has no pain with resisted internal rotation, external rotation, abduction.  He had mildly positive Julio sign of left negative on the right.  He has negative empty can sign bilaterally.   Sensation and circulation are intact.    Assessment: This appears to be cervical disc pain radiating into left arm.  Shoulders look normal.  Plan: We'll refer therapy for neck strengthening and posture training and stretching.  He should take her out at home what positions are causing problems and avoid them. This would be especially if the positions are looking down or looking up or twisting his neck too much.

## 2018-06-05 NOTE — LETTER
6/5/2018         RE: Tony Ortega  [address] [facility] [address]        Dear Colleague,    Thank you for referring your patient, Tony Ortega, to the [facility]. Please see a copy of my visit note below.    Tony Ortega is a 38 year old male who is seen in consultation at the request of Dr. Cullen Vick  for left neck and arm pain.  He has about a one-month history of dull aching pain extending to left neck and shoulder across the shoulder blade and down to the elbow. Does not cross down into the forearm. He does not know a specific cause but does do a fair amount of leaning on the arm leaning toward the left side. He has a friend who is had a neck fusion recently and reported that many of his symptoms were the same. He has dull aching pain rated 3 out of 10. Tylenol does help    He has had MRI scan of the shoulder and the scapula there is no rotator cuff tear minimal subacromial bursal mild acromio-clavicular . 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 MRI of the neck has not been performed.    Past Medical History:   Diagnosis Date     Overweight 9/16/2015     Problem list name updated by automated process. Provider to review       Past Surgical History:   Procedure Laterality Date     EYE SURGERY  2013/04    lasik       Family History   Problem Relation Age of Onset     DIABETES Paternal Grandfather      Hypertension Paternal Grandfather      DIABETES Maternal Grandfather      Coronary Artery Disease Maternal Grandfather      Hypertension Maternal Grandfather      Colon Cancer Maternal Grandfather      Thyroid Disease Father      Doctor thinks it related to a traumatic headinjury     Hyperlipidemia No family hx of      CEREBROVASCULAR DISEASE No family hx of      Prostate Cancer No family hx of      Depression No family hx of      Anxiety Disorder No family hx of      Thyroid Disease No family hx of      Asthma No family  hx of        Social History     Social History     Marital status: Single     Spouse name: N/A     Number of children: N/A     Years of education: N/A     Occupational History     Not on file.     Social History Main Topics     Smoking status: Former Smoker     Packs/day: 1.50     Years: 10.00     Types: Cigarettes     Start date: 8/1/2000     Quit date: 3/9/2012     Smokeless tobacco: Never Used     Alcohol use Yes      Comment: Occasionally     Drug use: No     Sexual activity: Yes     Partners: Female     Birth control/ protection: None     Other Topics Concern     Parent/Sibling W/ Cabg, Mi Or Angioplasty Before 65f 55m? No     Social History Narrative       No current outpatient prescriptions on file.       No Known Allergies    REVIEW OF SYSTEMS:  CONSTITUTIONAL:  NEGATIVE for fever, chills, change in weight, not feeling tired  SKIN:  NEGATIVE for worrisome rashes, no skin lumps, no skin ulcers and no non-healing wounds  EYES:  NEGATIVE for vision changes or irritation.  ENT/MOUTH:  NEGATIVE.  No hearing loss, no hoarseness, no difficulty swallowing.  RESP:  NEGATIVE. No cough or shortness of breath.  CV:  NEGATIVE for chest pain, palpitations or peripheral edema  GI:  NEGATIVE for nausea, abdominal pain, heartburn, or change in bowel habits  :  Negative. No dysuria, no hematuria  MUSCULOSKELETAL:  See HPI above  NEURO:  NEGATIVE . No headaches, no dizziness,  no numbness  ENDOCRINE:  NEGATIVE for temperature intolerance, skin/hair changes  HEME/ALLERGY/IMMUNE:  NEGATIVE for bleeding problems  PSYCHIATRIC:  NEGATIVE. no anxiety, no depression.     Exam:  Vitals: BP (!) 148/92 (BP Location: Left arm)  Resp 15  Ht 1.829 m (6')  Wt 113.4 kg (250 lb)  BMI 33.91 kg/m2  BMI= Body mass index is 33.91 kg/(m^2).  Constitutional:  healthy, alert and no distress  Neuro: Alert and Oriented x 3, Gait normal. Sensation grossly WNL.  Psych: Affect normal   Respiratory: Breathing not labored.  Cardiovascular: normal  peripheral pulses  Lymph: no adenopathy  Skin: No rashes,worrisome lesions or skin problems  He has full range of motion of the neck. Full extension of the neck does produce pain into the left side of the neck and shoulder.  He has full range of motion of the shoulders without pain.  He has no pain with resisted internal rotation, external rotation, abduction.  He had mildly positive Julio sign of left negative on the right.  He has negative empty can sign bilaterally.   Sensation and circulation are intact.    Assessment: This appears to be cervical disc pain radiating into left arm.  Shoulders look normal.  Plan: We'll refer therapy for neck strengthening and posture training and stretching.  He should take her out at home what positions are causing problems and avoid them. This would be especially if the positions are looking down or looking up or twisting his neck too much.      Again, thank you for allowing me to participate in the care of your patient.        Sincerely,        Jose Hernandez MD

## 2018-06-14 ENCOUNTER — THERAPY VISIT (OUTPATIENT)
Dept: PHYSICAL THERAPY | Facility: CLINIC | Age: 38
End: 2018-06-14
Payer: COMMERCIAL

## 2018-06-14 DIAGNOSIS — M25.512 CHRONIC PERISCAPULAR PAIN ON LEFT SIDE: ICD-10-CM

## 2018-06-14 DIAGNOSIS — G89.29 CHRONIC PERISCAPULAR PAIN ON LEFT SIDE: ICD-10-CM

## 2018-06-14 DIAGNOSIS — M54.2 NECK PAIN ON LEFT SIDE: Primary | ICD-10-CM

## 2018-06-14 DIAGNOSIS — M25.512 LEFT SHOULDER PAIN: ICD-10-CM

## 2018-06-14 PROCEDURE — 97110 THERAPEUTIC EXERCISES: CPT | Mod: GP | Performed by: PHYSICAL THERAPIST

## 2018-06-14 PROCEDURE — 97161 PT EVAL LOW COMPLEX 20 MIN: CPT | Mod: GP | Performed by: PHYSICAL THERAPIST

## 2018-06-14 PROCEDURE — 97112 NEUROMUSCULAR REEDUCATION: CPT | Mod: GP | Performed by: PHYSICAL THERAPIST

## 2018-06-14 NOTE — PROGRESS NOTES
[facility] Initial Evaluation  Subjective:  Patient is a 38 year old male presenting with rehab cervical spine hpi.   Tony Ortega is a 38 year old male with a cervical spine condition.  Condition occurred with:  Insidious onset.  Condition occurred: for unknown reasons.  This is a new condition     Patient reports pain:  Cervical left side.  Radiates to:  Upper arm left and shoulder left.  Pain is described as aching and is intermittent and reported as 5/10.  Associated symptoms:  Loss of motion/stiffness. Pain is worse in the P.M. and worse in the A.M..  Symptoms are exacerbated by sitting, rotating head and looking up or down (LEANING ON L U/E IN SITTING. ) and relieved by activity/movement (GOOD POSTURE).  Since onset symptoms are gradually improving.  Special tests:  MRI (SHOULDER).  Previous treatment: NONE.      Pertinent medical history includes:  Overweight, smoking and hepatitis.  Medical allergies: no.  Other surgeries include:  No.  Current medications:  None as reported by the patient.  Current occupation is . .  Patient is working in normal job with restrictions.  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    Barriers include:  None as reported by the patient.    Red flags:  None as reported by the patient.                        Objective:  System              Cervical/Thoracic Evaluation      Strength:  STRENGTH: R: 149#, L: 159#.     Cervical Myotomes:        C4 (shrug):  Left: 5    Right: 5  C5 (Deltoid):  Left: 5    Right: 5  C6 (Biceps):  Left: 5    Right: 5  C7 (Triceps):  Left: 5    Right: 5  C8 (Thumb Ext): Left: 5    Right: 5  T1 (Intrinsics): Left: 5    Right: 5                       Shoulder Evaluation:  ROM:  AROM:  normal                        Extension/Internal Rotation:  Left:  ERP MILD                                                         Terrell Cervical Evaluation    Posture:  Sitting: poor        Correction  of Posture: better    Movement Loss:  Protrusion (PRO): nil  Flexion (Flex): nil  Retraction (RET): pain and nil  Extension (EXT): pain  Lateral Flexion Right (LF R): pain  Lateral Flexion Left (LF L): pain  Rotation Right (ROT R): nil  Rotation Left (ROT L): nil and pain  Test Movements:    PRO: During: no effect    Repeat PRO: During: increases    RET: During: no effect    Repeat RET: During: centralizing    RET EXT: During: centralizing                            Conclusion: derangement  Principle of Treatment:  Posture Correction: IN SITTING WITH TOWEL ROLL.     Extension: RETRACTION, RETRACTION EXTENSION     Other: NEUTRAL SPINE, THER EX, THER ACT, NMR, MANUAL THERAPY                                         ROS    Assessment/Plan:    Patient is a 38 year old male with cervical complaints.    Patient has the following significant findings with corresponding treatment plan.                Diagnosis 1:  CERVICAL DERANGEMENT.   CHRONIC PERISCAPULAR PAIN ON LEFT SIDE, LEFT SHOULDER PAIN.   Pain -  hot/cold therapy, US, electric stimulation, mechanical traction, manual therapy, splint/taping/bracing/orthotics, self management, education, directional preference exercise and home program  Decreased function - therapeutic activities and home program  Impaired posture - neuro re-education, therapeutic activities and home program    Therapy Evaluation Codes:   1) History comprised of:   Personal factors that impact the plan of care:      Past/current experiences.    Comorbidity factors that impact the plan of care are:      None.     Medications impacting care: None.  2) Examination of Body Systems comprised of:   Body structures and functions that impact the plan of care:      Cervical spine.   Activity limitations that impact the plan of care are:      Cooking, Driving, Lifting, Sitting, Working, Sleeping and Blacksmithing. .  3) Clinical presentation characteristics  are:   Stable/Uncomplicated.  4) Decision-Making    Low complexity using standardized patient assessment instrument and/or measureable assessment of functional outcome.  Cumulative Therapy Evaluation is: Low complexity.    Previous and current functional limitations:  (See Goal Flow Sheet for this information)    Short term and Long term goals: (See Goal Flow Sheet for this information)     Communication ability:  Patient appears to be able to clearly communicate and understand verbal and written communication and follow directions correctly.  Treatment Explanation - The following has been discussed with the patient:   RX ordered/plan of care  Anticipated outcomes  Possible risks and side effects  This patient would benefit from PT intervention to resume normal activities.   Rehab potential is good.    Frequency:  1 X week, once daily  Duration:  for 6 weeks  Discharge Plan:  Achieve all LTG.  Independent in home treatment program.  Reach maximal therapeutic benefit.    Please refer to the daily flowsheet for treatment today, total treatment time and time spent performing 1:1 timed codes.

## 2018-06-14 NOTE — LETTER
[facility]  [address]  [facility] [address]  [phone]    2018    Re: Tony Ortega   :   1980  MRN:  [id number]   REFERRING PHYSICIAN:   Edmund Hadley  [facility]  Date of Initial Evaluation: 2018  Visits:  Rxs Used: 1  Reason for Referral:     Neck pain on left side  Left shoulder pain  Chronic periscapular pain on left side    EVALUATION SUMMARY  [facility]  Subjective:  Patient is a 38 year old male presenting with rehab cervical spine hpi.   Tony Ortega is a 38 year old male with a cervical spine condition.  Condition occurred with:  Insidious onset.  Condition occurred: for unknown reasons.  This is a new condition     Patient reports pain:  Cervical left side.  Radiates to:  Upper arm left and shoulder left.  Pain is described as aching and is intermittent and reported as 5/10.  Associated symptoms:  Loss of motion/stiffness. Pain is worse in the P.M. and worse in the A.M..  Symptoms are exacerbated by sitting, rotating head and looking up or down (LEANING ON L U/E IN SITTING. ) and relieved by activity/movement (GOOD POSTURE).  Since onset symptoms are gradually improving.  Special tests:  MRI (SHOULDER).  Previous treatment: NONE.      Pertinent medical history includes:  Overweight, smoking and hepatitis.  Medical allergies: no.  Other surgeries include:  No.  Current medications:  None as reported by the patient.  Current occupation is . .  Patient is working in normal job with restrictions.  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  Barriers include:  None as reported by the patient.  Red flags:  None as reported by the patient.  Objective:  System  Cervical/Thoracic Evaluation  Strength:  STRENGTH: R: 149#, L: 159#.   Cervical Myotomes:    C4 (shrug):  Left: 5    Right: 5  C5 (Deltoid):  Left: 5     Right: 5  C6 (Biceps):  Left: 5    Right: 5  C7 (Triceps):  Left: 5    Right: 5  C8 (Thumb Ext): Left: 5    Right: 5  T1 (Intrinsics): Left: 5    Right: 5  Shoulder Evaluation:  ROM:  AROM:  normal  Extension/Internal Rotation:  Left:  ERP MILD        Terrell Cervical Evaluation  Posture:  Sitting: poor  Correction of Posture: better  Movement Loss:  Protrusion (PRO): nil  Flexion (Flex): nil  Retraction (RET): pain and nil  Extension (EXT): pain  Lateral Flexion Right (LF R): pain  Lateral Flexion Left (LF L): pain  Rotation Right (ROT R): nil  Rotation Left (ROT L): nil and pain  Test Movements:  PRO: During: no effect    Repeat PRO: During: increases    RET: During: no effect    Repeat RET: During: centralizing    RET EXT: During: centralizing    Conclusion: derangement  Principle of Treatment:  Posture Correction: IN SITTING WITH TOWEL ROLL.   Extension: RETRACTION, RETRACTION EXTENSION   Other: NEUTRAL SPINE, THER EX, THER ACT, NMR, MANUAL THERAPY           Assessment/Plan:    Patient is a 38 year old male with cervical complaints.    Patient has the following significant findings with corresponding treatment plan.                Diagnosis 1:  CERVICAL DERANGEMENT.   CHRONIC PERISCAPULAR PAIN ON LEFT SIDE, LEFT SHOULDER PAIN.   Pain -  hot/cold therapy, US, electric stimulation, mechanical traction, manual therapy, splint/taping/bracing/orthotics, self management, education, directional preference exercise and home program  Decreased function - therapeutic activities and home program  Impaired posture - neuro re-education, therapeutic activities and home program  Therapy Evaluation Codes:   1) History comprised of:   Personal factors that impact the plan of care:      Past/current experiences.    Comorbidity factors that impact the plan of care are:      None.     Medications impacting care: None.  2) Examination of Body Systems comprised of:   Body structures and functions that impact the plan of care:       Cervical spine.   Activity limitations that impact the plan of care are:      Cooking, Driving, Lifting, Sitting, Working, Sleeping and Blacksmithing. .  3) Clinical presentation characteristics are:   Stable/Uncomplicated.  4) Decision-Making    Low complexity using standardized patient assessment instrument and/or measureable assessment of functional outcome.  Cumulative Therapy Evaluation is: Low complexity.  Previous and current functional limitations:  (See Goal Flow Sheet for this information)    Short term and Long term goals: (See Goal Flow Sheet for this information)   Communication ability:  Patient appears to be able to clearly communicate and understand verbal and written communication and follow directions correctly.  Treatment Explanation - The following has been discussed with the patient:   RX ordered/plan of care  Anticipated outcomes  Possible risks and side effects  This patient would benefit from PT intervention to resume normal activities.   Rehab potential is good.  Frequency:  1 X week, once daily  Duration:  for 6 weeks  Discharge Plan:  Achieve all LTG.  Independent in home treatment program.  Reach maximal therapeutic benefit.    Please refer to the daily flowsheet for treatment today, total treatment time and time spent performing 1:1 timed codes.     Thank you for your referral.    INQUIRIES  Therapist: Chuck Mora, PT  [facility]  [address]  [facility] [address]  Phone: [phone]  Fax: [phone]

## 2018-06-14 NOTE — MR AVS SNAPSHOT
After Visit Summary   6/14/2018    Tony Ortega    MRN: [id number]           Patient Information     Date Of Birth          1980        Visit Information        Provider Department      6/14/2018 4:40 PM William Mora, PT [facility]        Today's Diagnoses     Neck pain on left side    -  1    Left shoulder pain        Chronic periscapular pain on left side           Follow-ups after your visit        Your next 10 appointments already scheduled     Jun 21, 2018  8:50 AM CDT   [facility] Spine with William Mora, MANDY   [facility] ([facility]  )    [address]  [facility] [address]   [phone]            Jun 28, 2018  8:10 AM CDT   [facility] Spine with William Mora, PT   [facility] ([facility]  )    [address]  [facility] [address]   [phone]              Who to contact     If you have questions or need follow up information about today's clinic visit or your schedule please contact [facility] directly at [phone].  Normal or non-critical lab and imaging results will be communicated to you by MyChart, letter or phone within 4 business days after the clinic has received the results. If you do not hear from us within 7 days, please contact the clinic through [employer] or phone. If you have a critical or abnormal lab result, we will notify you by phone as soon as possible.  Submit refill requests through [employer] or call your pharmacy and they will forward the refill request to us. Please allow 3 business days for your refill to be completed.          Additional Information About Your Visit        [employer] Information     [employer] gives you secure access to your electronic health record. If you see a primary care provider, you can also send messages to your care team and make appointments. If you have questions, please  call your primary care clinic.  If you do not have a primary care provider, please call [phone] and they will assist you.        Care EveryWhere ID     This is your Care EveryWhere ID. This could be used by other organizations to access your [facility] medical records  [id number]         Blood Pressure from Last 3 Encounters:   06/05/18 (!) 148/92   05/29/18 138/86   05/17/18 (!) 165/91    Weight from Last 3 Encounters:   06/05/18 113.4 kg (250 lb)   05/29/18 114.8 kg (253 lb)   05/17/18 114 kg (251 lb 6.4 oz)              We Performed the Following     [facility] Inital Eval Report     Neuromuscular Re-Education     PT Deisy, Low Complexity ([id number])     Therapeutic Exercises        Primary Care Provider Office Phone # Fax #    Sri De La Rosa Tevin Singer [phone]       [address]  [facility] [address]        Equal Access to Services     FAYE DE LA ROSA : Hadii aad ku hadasho Soomaali, waaxda luqadaha, qaybta kaalmada adeegyada, waxay idiin hayaan adeeg kharash la'salma . So [facility] [phone].    ATENCIÓN: Si habla español, tiene a orozco disposición servicios gratuitos de asistencia lingüística. Llame al [phone].    We comply with applicable federal civil rights laws and [place] laws. We do not discriminate on the basis of race, color, national origin, age, disability, sex, sexual orientation, or gender identity.            Thank you!     Thank you for choosing [facility]  for your care. Our goal is always to provide you with excellent care. Hearing back from our patients is one way we can continue to improve our services. Please take a few minutes to complete the written survey that you may receive in the mail after your visit with us. Thank you!             Your Updated Medication List - Protect others around you: Learn how to safely use, store and throw away your medicines at www.disposemymeds.org.      Notice  As of 6/14/2018  9:36 PM    You have  not been prescribed any medications.

## 2018-06-21 ENCOUNTER — THERAPY VISIT (OUTPATIENT)
Dept: PHYSICAL THERAPY | Facility: CLINIC | Age: 38
End: 2018-06-21
Payer: COMMERCIAL

## 2018-06-21 DIAGNOSIS — M54.2 NECK PAIN ON LEFT SIDE: ICD-10-CM

## 2018-06-21 DIAGNOSIS — M25.512 CHRONIC PERISCAPULAR PAIN ON LEFT SIDE: Primary | ICD-10-CM

## 2018-06-21 DIAGNOSIS — G89.29 CHRONIC PERISCAPULAR PAIN ON LEFT SIDE: Primary | ICD-10-CM

## 2018-06-21 DIAGNOSIS — M25.512 LEFT SHOULDER PAIN: ICD-10-CM

## 2018-06-21 PROCEDURE — 97110 THERAPEUTIC EXERCISES: CPT | Mod: GP | Performed by: PHYSICAL THERAPIST

## 2018-06-21 PROCEDURE — 97140 MANUAL THERAPY 1/> REGIONS: CPT | Mod: GP | Performed by: PHYSICAL THERAPIST

## 2018-06-28 ENCOUNTER — THERAPY VISIT (OUTPATIENT)
Dept: PHYSICAL THERAPY | Facility: CLINIC | Age: 38
End: 2018-06-28
Payer: COMMERCIAL

## 2018-06-28 DIAGNOSIS — M25.512 CHRONIC PERISCAPULAR PAIN ON LEFT SIDE: ICD-10-CM

## 2018-06-28 DIAGNOSIS — G89.29 CHRONIC PERISCAPULAR PAIN ON LEFT SIDE: ICD-10-CM

## 2018-06-28 DIAGNOSIS — M54.2 NECK PAIN ON LEFT SIDE: ICD-10-CM

## 2018-06-28 DIAGNOSIS — M25.512 LEFT SHOULDER PAIN: ICD-10-CM

## 2018-06-28 PROCEDURE — 97012 MECHANICAL TRACTION THERAPY: CPT | Mod: GP | Performed by: PHYSICAL THERAPIST

## 2018-06-28 PROCEDURE — 97110 THERAPEUTIC EXERCISES: CPT | Mod: GP | Performed by: PHYSICAL THERAPIST

## 2018-06-28 PROCEDURE — 97112 NEUROMUSCULAR REEDUCATION: CPT | Mod: GP | Performed by: PHYSICAL THERAPIST

## 2018-12-26 ENCOUNTER — ALLIED HEALTH/NURSE VISIT (OUTPATIENT)
Dept: NURSING | Facility: CLINIC | Age: 38
End: 2018-12-26
Payer: COMMERCIAL

## 2018-12-26 DIAGNOSIS — S91.319A CUT OF FOOT: ICD-10-CM

## 2018-12-26 DIAGNOSIS — Z09 NEED FOR IMMUNIZATION FOLLOW-UP: Primary | ICD-10-CM

## 2018-12-26 PROCEDURE — 90471 IMMUNIZATION ADMIN: CPT

## 2018-12-26 PROCEDURE — 99207 ZZC NO CHARGE NURSE ONLY: CPT

## 2018-12-26 PROCEDURE — 90715 TDAP VACCINE 7 YRS/> IM: CPT

## 2018-12-26 NOTE — PROGRESS NOTES
Screening Questionnaire for Adult Immunization    Are you sick today?   No   Do you have allergies to medications, food, a vaccine component or latex?   No   Have you ever had a serious reaction after receiving a vaccination?   No   Do you have a long-term health problem with heart disease, lung disease, asthma, kidney disease, metabolic disease (e.g. diabetes), anemia, or other blood disorder?   No   Do you have cancer, leukemia, HIV/AIDS, or any other immune system problem?   No   In the past 3 months, have you taken medications that affect  your immune system, such as prednisone, other steroids, or anticancer drugs; drugs for the treatment of rheumatoid arthritis, Crohn s disease, or psoriasis; or have you had radiation treatments?   No   Have you had a seizure, or a brain or other nervous system problem?   No   During the past year, have you received a transfusion of blood or blood     products, or been given immune (gamma) globulin or antiviral drug?   No   For women: Are you pregnant or is there a chance you could become        pregnant during the next month?   No   Have you received any vaccinations in the past 4 weeks?   No     Immunization questionnaire answers were all negative.   Patient requested the tdap because he recent step on a nail at work about 1-2 weeks ago. He's a  and it has been over 5 years since his last adacel.   Per orders of Dr. Maciel, injection of tdap given by Cortney Stephens. Patient instructed to remain in clinic for 15 minutes afterwards, and to report any adverse reaction to me immediately.       Screening performed by Cortney Stephens on 12/26/2018 at 8:54 AM.

## 2019-01-10 ENCOUNTER — OFFICE VISIT (OUTPATIENT)
Dept: URGENT CARE | Facility: URGENT CARE | Age: 39
End: 2019-01-10
Payer: COMMERCIAL

## 2019-01-10 VITALS
DIASTOLIC BLOOD PRESSURE: 78 MMHG | BODY MASS INDEX: 32.9 KG/M2 | OXYGEN SATURATION: 97 % | HEART RATE: 95 BPM | RESPIRATION RATE: 18 BRPM | WEIGHT: 242.6 LBS | TEMPERATURE: 100.6 F | SYSTOLIC BLOOD PRESSURE: 141 MMHG

## 2019-01-10 DIAGNOSIS — R50.9 FEVER, UNSPECIFIED FEVER CAUSE: Primary | ICD-10-CM

## 2019-01-10 DIAGNOSIS — J02.0 STREP THROAT: ICD-10-CM

## 2019-01-10 DIAGNOSIS — J11.1 INFLUENZA-LIKE ILLNESS: ICD-10-CM

## 2019-01-10 LAB
DEPRECATED S PYO AG THROAT QL EIA: ABNORMAL
FLUAV+FLUBV AG SPEC QL: NEGATIVE
FLUAV+FLUBV AG SPEC QL: NEGATIVE
SPECIMEN SOURCE: ABNORMAL
SPECIMEN SOURCE: NORMAL

## 2019-01-10 PROCEDURE — 87804 INFLUENZA ASSAY W/OPTIC: CPT | Performed by: PHYSICIAN ASSISTANT

## 2019-01-10 PROCEDURE — 99214 OFFICE O/P EST MOD 30 MIN: CPT | Performed by: PHYSICIAN ASSISTANT

## 2019-01-10 PROCEDURE — 87880 STREP A ASSAY W/OPTIC: CPT | Performed by: PHYSICIAN ASSISTANT

## 2019-01-10 RX ORDER — PENICILLIN V POTASSIUM 500 MG/1
500 TABLET, FILM COATED ORAL 2 TIMES DAILY
Qty: 20 TABLET | Refills: 0 | Status: SHIPPED | OUTPATIENT
Start: 2019-01-10 | End: 2019-01-20

## 2019-01-10 ASSESSMENT — ENCOUNTER SYMPTOMS
SORE THROAT: 1
EYE ITCHING: 0
SHORTNESS OF BREATH: 0
POLYDIPSIA: 0
CHEST TIGHTNESS: 0
NEUROLOGICAL NEGATIVE: 1
ABDOMINAL PAIN: 0
CHILLS: 0
HEADACHES: 0
CARDIOVASCULAR NEGATIVE: 1
RHINORRHEA: 0
FREQUENCY: 0
HEMATURIA: 0
WEAKNESS: 0
DIARRHEA: 0
EYES NEGATIVE: 1
MYALGIAS: 1
ADENOPATHY: 0
PALPITATIONS: 0
NAUSEA: 0
EYE DISCHARGE: 0
DYSURIA: 0
ENDOCRINE NEGATIVE: 1
WHEEZING: 0
VOMITING: 0
LIGHT-HEADEDNESS: 0
DIAPHORESIS: 0
DIZZINESS: 0
EYE REDNESS: 0
FEVER: 1
COUGH: 1
GASTROINTESTINAL NEGATIVE: 1

## 2019-01-10 NOTE — PROGRESS NOTES
Chief Complaint:     Chief Complaint   Patient presents with     Fever     x3 days       HPI: Tony Ortega is an 38 year old male who presents with dry cough, sore throat, fevers and body aches. It began  3 day(s) ago and has unchanged.  Cough is dry, occasional There is no shortness of breath, wheezing and chest pain.      Recent travel?  no.      ROS:     Review of Systems   Constitutional: Positive for fever. Negative for chills and diaphoresis.   HENT: Positive for sore throat. Negative for congestion, ear pain and rhinorrhea.    Eyes: Negative.  Negative for discharge, redness and itching.   Respiratory: Positive for cough. Negative for chest tightness, shortness of breath and wheezing.    Cardiovascular: Negative.  Negative for chest pain and palpitations.   Gastrointestinal: Negative.  Negative for abdominal pain, diarrhea, nausea and vomiting.   Endocrine: Negative.  Negative for polydipsia and polyuria.   Genitourinary: Negative for dysuria, frequency, hematuria and urgency.   Musculoskeletal: Positive for myalgias.   Skin: Negative for rash.   Allergic/Immunologic: Negative for immunocompromised state.   Neurological: Negative.  Negative for dizziness, weakness, light-headedness and headaches.   Hematological: Negative for adenopathy.        Respiratory History  occasional episodes of bronchitis       Family History   Family History   Problem Relation Age of Onset     Diabetes Paternal Grandfather      Hypertension Paternal Grandfather      Diabetes Maternal Grandfather      Coronary Artery Disease Maternal Grandfather      Hypertension Maternal Grandfather      Colon Cancer Maternal Grandfather      Thyroid Disease Father         Doctor thinks it related to a traumatic headinjury     Hyperlipidemia No family hx of      Cerebrovascular Disease No family hx of      Prostate Cancer No family hx of      Depression No family hx of      Anxiety Disorder No family hx of      Thyroid Disease No family hx of       Asthma No family hx of         Problem history  Patient Active Problem List   Diagnosis     Lipid screening     Obesity, Class I, BMI 30-34.9     Screening for diabetes mellitus     Cervicalgia     Neck pain on left side     Left shoulder pain     Chronic periscapular pain on left side        Allergies  No Known Allergies     Social History  Social History     Socioeconomic History     Marital status:      Spouse name: Not on file     Number of children: Not on file     Years of education: Not on file     Highest education level: Not on file   Social Needs     Financial resource strain: Not on file     Food insecurity - worry: Not on file     Food insecurity - inability: Not on file     Transportation needs - medical: Not on file     Transportation needs - non-medical: Not on file   Occupational History     Not on file   Tobacco Use     Smoking status: Former Smoker     Packs/day: 1.50     Years: 10.00     Pack years: 15.00     Types: Cigarettes     Start date: 2000     Last attempt to quit: 3/9/2012     Years since quittin.8     Smokeless tobacco: Never Used   Substance and Sexual Activity     Alcohol use: Yes     Comment: Occasionally     Drug use: No     Sexual activity: Yes     Partners: Female     Birth control/protection: None   Other Topics Concern     Parent/sibling w/ CABG, MI or angioplasty before 65F 55M? No   Social History Narrative     Not on file        Current Meds    Current Outpatient Medications:      penicillin V (VEETID) 500 MG tablet, Take 1 tablet (500 mg) by mouth 2 times daily for 10 days, Disp: 20 tablet, Rfl: 0        OBJECTIVE     Vital signs reviewed by Jaleel Gary  /78   Pulse 95   Temp 100.6  F (38.1  C) (Oral)   Resp 18   Wt 110 kg (242 lb 9.6 oz)   SpO2 97%   BMI 32.90 kg/m       Physical Exam   Constitutional: He is oriented to person, place, and time. He appears well-developed and well-nourished. No distress.   HENT:   Head: Normocephalic and  atraumatic.   Right Ear: Hearing, tympanic membrane, external ear and ear canal normal. Tympanic membrane is not perforated, not erythematous, not retracted and not bulging.   Left Ear: Hearing, tympanic membrane, external ear and ear canal normal. Tympanic membrane is not perforated, not erythematous, not retracted and not bulging.   Nose: Mucosal edema and rhinorrhea present.   Mouth/Throat: Mucous membranes are normal. Posterior oropharyngeal erythema present. No oropharyngeal exudate or posterior oropharyngeal edema. Tonsils are 0 on the right. Tonsils are 0 on the left. No tonsillar exudate.   Eyes: Conjunctivae and EOM are normal. Pupils are equal, round, and reactive to light. Right eye exhibits no discharge. Left eye exhibits no discharge.   Neck: Normal range of motion. Neck supple.   Cardiovascular: Normal rate, regular rhythm, normal heart sounds and intact distal pulses. Exam reveals no gallop and no friction rub.   No murmur heard.  Pulmonary/Chest: Effort normal and breath sounds normal. No stridor. No respiratory distress. He has no wheezes. He has no rhonchi. He has no rales. He exhibits no tenderness.   Abdominal: Soft. Bowel sounds are normal. He exhibits no distension and no mass. There is no tenderness. There is no guarding.   Musculoskeletal: Normal range of motion.   Neurological: He is alert and oriented to person, place, and time. He displays normal reflexes. No cranial nerve deficit or sensory deficit. He exhibits normal muscle tone. Coordination normal.   Skin: Skin is warm. Capillary refill takes less than 2 seconds. He is not diaphoretic.   Psychiatric: He has a normal mood and affect. His behavior is normal. Judgment and thought content normal.         Labs:     Results for orders placed or performed in visit on 01/10/19   Strep, Rapid Screen   Result Value Ref Range    Specimen Description Throat     Rapid Strep A Screen (A)      POSITIVE: Group A Streptococcal antigen detected by  immunoassay.   Influenza A/B antigen   Result Value Ref Range    Influenza A/B Agn Specimen Nasal     Influenza A Negative NEG^Negative    Influenza B Negative NEG^Negative         Medical Decision Making:    Differential Diagnosis:  URI Adult/Peds:  Bronchitis-viral, Influenza, Pneumonia, Strep pharyngitis, Viral pharyngitis, Viral syndrome and Viral upper respiratory illness        ASSESSMENT    1. Fever, unspecified fever cause    2. Strep throat    3. Influenza-like illness        PLAN  Patient presents with 3 days of cough.  Patient is in no acute distress and has temp of 100.6 in clinic tonight.  Lung sounds were clear and O2 sats at 97% on RA.  Imaging to rule out pneumonia is not indicated at this time.  RST was positive.  Rx for Penicillin today.  Influenza was negative  Rest, Push fluids, vaporizer, elevation of head of bed.  Ibuprofen and or Tylenol for any fever or body aches.  Over the counter cough suppressant- PRN- as discussed.   If symptoms worsen, recheck immediately otherwise follow up with your PCP in 1 week if symptoms are not improving.  Worrisome symptoms discussed with instructions to go to the ED.  Patient verbalized understanding and agreed with this plan.         Jaleel Gary  1/10/2019, 4:17 PM

## 2019-12-11 ENCOUNTER — OFFICE VISIT (OUTPATIENT)
Dept: URGENT CARE | Facility: URGENT CARE | Age: 39
End: 2019-12-11
Payer: COMMERCIAL

## 2019-12-11 VITALS
DIASTOLIC BLOOD PRESSURE: 93 MMHG | BODY MASS INDEX: 33.72 KG/M2 | HEIGHT: 72 IN | SYSTOLIC BLOOD PRESSURE: 148 MMHG | OXYGEN SATURATION: 98 % | TEMPERATURE: 98.1 F | WEIGHT: 249 LBS | HEART RATE: 78 BPM

## 2019-12-11 DIAGNOSIS — H66.001 NON-RECURRENT ACUTE SUPPURATIVE OTITIS MEDIA OF RIGHT EAR WITHOUT SPONTANEOUS RUPTURE OF TYMPANIC MEMBRANE: Primary | ICD-10-CM

## 2019-12-11 DIAGNOSIS — R03.0 ELEVATED BP WITHOUT DIAGNOSIS OF HYPERTENSION: ICD-10-CM

## 2019-12-11 DIAGNOSIS — H83.02 LABYRINTHITIS OF LEFT EAR: ICD-10-CM

## 2019-12-11 PROCEDURE — 99214 OFFICE O/P EST MOD 30 MIN: CPT | Performed by: FAMILY MEDICINE

## 2019-12-11 RX ORDER — AMOXICILLIN 875 MG
875 TABLET ORAL 2 TIMES DAILY
Qty: 20 TABLET | Refills: 0 | Status: SHIPPED | OUTPATIENT
Start: 2019-12-11 | End: 2020-03-04

## 2019-12-11 RX ORDER — MECLIZINE HYDROCHLORIDE 25 MG/1
25 TABLET ORAL 3 TIMES DAILY PRN
Qty: 20 TABLET | Refills: 0 | Status: SHIPPED | OUTPATIENT
Start: 2019-12-11 | End: 2020-07-15

## 2019-12-11 ASSESSMENT — PAIN SCALES - GENERAL: PAINLEVEL: NO PAIN (0)

## 2019-12-11 ASSESSMENT — MIFFLIN-ST. JEOR: SCORE: 2087.46

## 2019-12-11 NOTE — PROGRESS NOTES
SUBJECTIVE:   Chief Complaint   Patient presents with     Ear Problem     Left     Dizziness     a couple days     Tony Ortega is a 39 year old male complains of dizziness for 2 days.   Timing: gradual onset, still present and constantonset during, light activity, change in  position and movement of head.  Quality: room spinning/falling/movement, off balance., motion sickness  The symptoms become more intense with :changing position  , movement of the head,  Riding in the car.  Associated symptoms:left ear feel plugged;/ pressure  does interfere with activities of daily living;-  With movement he feels like he will vomit  denies any previous problems with similar symptoms.      Past Medical History:   Diagnosis Date     Overweight 2015     Problem list name updated by automated process. Provider to review     Patient Active Problem List   Diagnosis     Lipid screening     Obesity, Class I, BMI 30-34.9     Screening for diabetes mellitus     Cervicalgia     Neck pain on left side     Left shoulder pain     Chronic periscapular pain on left side       ALLERGIES:  Patient has no known allergies.    No current outpatient medications on file prior to visit.  No current facility-administered medications on file prior to visit.       Social History     Tobacco Use     Smoking status: Former Smoker     Packs/day: 1.50     Years: 10.00     Pack years: 15.00     Types: Cigarettes     Start date: 2000     Last attempt to quit: 3/9/2012     Years since quittin.7     Smokeless tobacco: Never Used   Substance Use Topics     Alcohol use: Yes     Comment: Occasionally       Family History   Problem Relation Age of Onset     Diabetes Paternal Grandfather      Hypertension Paternal Grandfather      Diabetes Maternal Grandfather      Coronary Artery Disease Maternal Grandfather      Hypertension Maternal Grandfather      Colon Cancer Maternal Grandfather      Thyroid Disease Father         Doctor thinks it related to  a traumatic headinjury     Hyperlipidemia No family hx of      Cerebrovascular Disease No family hx of      Prostate Cancer No family hx of      Depression No family hx of      Anxiety Disorder No family hx of      Thyroid Disease No family hx of      Asthma No family hx of        ROS:  CONSTITUTIONAL:NEGATIVE for fever, chills,    INTEGUMENTARY/SKIN: NEGATIVE for worrisome rashes,  or lesions  EYES: NEGATIVE for vision changes or irritation  RESP:NEGATIVE for significant cough or SOB  GI: NEGATIVE for   abdominal pain, heartburn, or change in bowel habits    OBJECTIVE:  BP (!) 148/93   Pulse 78   Temp 98.1  F (36.7  C) (Oral)   Ht 1.829 m (6')   Wt 112.9 kg (249 lb)   SpO2 98%   BMI 33.77 kg/m       GENERAL APPEARANCE: alert, mild distress, cooperative,   EYES: EOMI,  PERRL, conjunctiva clear,  Fundi normal  HENT: ear canals and right TM'  normal.   Left TM with erythema, bulging Nose and mouth without ulcers, erythema or lesions.  No sinus tenderness  NECK: supple, nontender, no lymphadenopathy  RESP: lungs clear to auscultation - no rales, rhonchi or wheezes  CV: regular rates and rhythm, normal S1 S2, no murmur noted  SKIN: no suspicious lesions or rashes     NEURO:  Mental status normal.   Cerebellar function is normal.   Motor, sensory function normal in all extremities.    Walks   without difficulty.   Normal strength and tone,  Normal speech and mentation, cranial nerves normal  Pulse regular. Rapid changes in head position during the exam do precipitate brief dizziness  .        ASSESSMENT:  Elevated BP without diagnosis of hypertension    Blood pressure taken today was   Elevated.  Likely associated with stress of illness     Frequent home/ store blood pressure checks are encouraged at different times of day.  Patient should keep a diary of blood pressure levels and share that information with the primary care provider.    Non-recurrent acute suppurative otitis media of right ear without spontaneous  rupture of tympanic membrane     - amoxicillin (AMOXIL) 875 MG tablet; Take 1 tablet (875 mg) by mouth 2 times daily for 10 days    Symptomatic treatment with acetaminophen or Ibuprofen as needed for pain and for fever.       Labyrinthitis of left ear     - meclizine (ANTIVERT) 25 MG tablet; Take 1 tablet (25 mg) by mouth 3 times daily as needed for dizziness     The patient is reassured that these symptoms do not appear to represent a serious or threatening condition. This is generally a self-limited temporary but uncomfortable situation.  We discussed the role of the semi-circular canals in detecting and maintaining balance and that a viral illness in the ear/ sinus can cause swelling in the semi-circular canals and promote vertigo symptoms      Rest, avoid potentially dangerous activities (such as driving or working with machinery or at heights), use OTC Meclizine prn.      Should go to the ED if patient develops other symptoms, such as alterations of speech, swallowing, vision, motor or sensory systems.      Follow-up with primary care or ENT if dizziness persists or worsens.    Patient education materials were provided about diagnosis and treatment

## 2019-12-11 NOTE — PATIENT INSTRUCTIONS
Patient Education     Labyrinthitis    The inner ear is located behind the middle ear. It is part of the balance center of your body. When the inner ear becomes irritated or inflamed it causes a condition known as labyrinthitis. It may due to a viral infection, but often a cause is not found. Labyrinthitis causes sudden dizziness and balance problems. It often causes a feeling that you or the room is spinning (vertigo). You may feel nauseated or throw up. You may also feel a loss of balance when trying to walk. Head movement from side to side or changes in body position (from lying to sitting or standing) may worsen symptoms. You may have ringing in the ear. Hearing may also be affected.  An episode of labyrinthitis may last seconds, minutes, or hours. It may never return. Or symptoms may recur off and on over several weeks or longer. In many cases, the problem is short-term and goes away when the inner ear issue resolves.  Home care    Take medicine as prescribed to relieve your symptoms. Unless another medicine was prescribed, you can try over-the-counter motion sickness pills. Note that these medicines may cause drowsiness.    If symptoms are severe, rest quietly in bed. Change positions slowly. There may be 1 position feels best, such as lying on 1 side or lying on your back with your head slightly raised on pillows. Until you have no symptoms, you are at a higher risk of falling. Let someone help you when you get up. Get rid of home hazards such as loose electrical cords and throw rugs. Don t walk in unfamiliar areas that are not lighted. Use night lights in bathrooms and kitchen areas.    Vestibular rehabilitation exercises are done by moving your head to help fix problems in the inner ear. If these exercises have been prescribed, do them as you have been instructed.    Do not drive or work with dangerous machinery until 1 week has passed without symptoms. Be careful when using stairs.  Follow-up  care  Follow up with your healthcare provider or as advised by our staff.  When to seek medical advice  Call your healthcare provider for any of the following:    Symptoms that are not controlled by medicine     Symptoms that get worse    Repeated vomiting that is not relieved by medicine    Weakness that gets worse    Fainting    Headache or unusual drowsiness    Trouble with vision or speech    Trouble moving your face, arms, or legs    Hearing loss    Symptoms that last more than a few days  Date Last Reviewed: 11/1/2017 2000-2018 [employer]. [address] [place] [address]. All rights reserved. This information is not intended as a substitute for professional medical care. Always follow your healthcare professional's instructions.           Patient Education     Middle Ear Infection (Adult)  You have an infection of the middle ear, the space behind the eardrum. This is also called acute otitis media (AOM). Sometimes it is caused by the common cold. This is because congestion can block the internal passage (eustachian tube) that drains fluid from the middle ear. When the middle ear fills with fluid, bacteria can grow there and cause an infection. Oral antibiotics are used to treat this illness, not ear drops. Symptoms usually start to improve within 1 to 2 days of treatment.    Home care  The following are general care guidelines:    Finish all of the antibiotic medicine given, even though you may feel better after the first few days.    You may use over-the-counter medicine, such as acetaminophen or ibuprofen, to control pain and fever, unless something else was prescribed. If you have chronic liver or kidney disease or have ever had a stomach ulcer or gastrointestinal bleeding, talk with your healthcare provider before using these medicines. Do not give aspirin to anyone under 18 years of age who has a fever. It may cause severe illness or death.  Follow-up care  Follow up with your  healthcare provider, or as advised, in 2 weeks if all symptoms have not gotten better, or if hearing doesn't go back to normal within 1 month.  When to seek medical advice  Call your healthcare provider right away if any of these occur:    Ear pain gets worse or does not improve after 3 days of treatment    Unusual drowsiness or confusion    Neck pain, stiff neck, or headache    Fluid or blood draining from the ear canal    Fever of 100.4 F (38 C) or as advised     Seizure  Date Last Reviewed: 6/1/2016 2000-2018 [employer]. [address] [place] [id number]. All rights reserved. This information is not intended as a substitute for professional medical care. Always follow your healthcare professional's instructions.

## 2020-02-24 ENCOUNTER — HEALTH MAINTENANCE LETTER (OUTPATIENT)
Age: 40
End: 2020-02-24

## 2020-03-04 ENCOUNTER — OFFICE VISIT (OUTPATIENT)
Dept: FAMILY MEDICINE | Facility: CLINIC | Age: 40
End: 2020-03-04
Payer: COMMERCIAL

## 2020-03-04 VITALS
BODY MASS INDEX: 34.29 KG/M2 | OXYGEN SATURATION: 96 % | DIASTOLIC BLOOD PRESSURE: 88 MMHG | WEIGHT: 252.8 LBS | HEART RATE: 70 BPM | RESPIRATION RATE: 20 BRPM | TEMPERATURE: 98.2 F | SYSTOLIC BLOOD PRESSURE: 136 MMHG

## 2020-03-04 DIAGNOSIS — R42 DIZZINESS: Primary | ICD-10-CM

## 2020-03-04 PROCEDURE — 99214 OFFICE O/P EST MOD 30 MIN: CPT | Performed by: FAMILY MEDICINE

## 2020-03-04 RX ORDER — FLUTICASONE PROPIONATE 50 MCG
2 SPRAY, SUSPENSION (ML) NASAL DAILY
COMMUNITY
End: 2020-07-15

## 2020-03-04 ASSESSMENT — PAIN SCALES - GENERAL: PAINLEVEL: MILD PAIN (2)

## 2020-03-04 NOTE — PROGRESS NOTES
Subjective     Tony Ortega is a 40 year old male who presents to clinic today for the following health issues:    HPI     Dizziness  Onset: 2 months ago- was seen in Urgent Care 12/11/2019, went to med clinic last week and was given flonase and that seems to work but for a short time only     Description:   Do you feel faint:  YES  Does it feel like the surroundings (bed, room) are moving: YES  Unsteady/off balance: YES  Have you passed out or fallen: no     Intensity: moderate    Progression of Symptoms:  same    Accompanying Signs & Symptoms:  Heart palpitations: no   Nausea, vomiting: YES  Weakness in arms or legs: no   Fatigue: YES  Vision or speech changes: no   Ringing in ears (Tinnitus): no   Hearing Loss: no     History:   Head trauma/concussion hx: no   Previous similar symptoms: YES  Recent bleeding history: no     Precipitating factors:   Worse with activity or head movement: YES  Any new medications (BP?): no   Alcohol/drug abuse/withdrawal: no     Alleviating factors:   Does staying in a fixed position give relief:  YES    Therapies Tried and outcome: flonase and meclizine- relief       Concern - left ear pain   Onset: 2 months ago- was seen in UC     Description: dull ache- feels like phelgm in the back of the throat most times     Intensity: mild      Therapies Tried and outcome: none        Reviewed and updated as needed this visit by Provider  Tobacco  Allergies  Meds  Problems  Med Hx  Surg Hx  Fam Hx         Review of Systems   ROS COMP: Constitutional, HEENT, cardiovascular, pulmonary, gi and gu systems are negative, except as otherwise noted.      Objective    /88   Pulse 70   Temp 98.2  F (36.8  C) (Oral)   Resp 20   Wt 114.7 kg (252 lb 12.8 oz)   SpO2 96%   BMI 34.29 kg/m    Body mass index is 34.29 kg/m .  Physical Exam   GENERAL: healthy, alert and no distress  EYES: Eyes grossly normal to inspection, PERRL and conjunctivae and sclerae normal  HENT: ear canals and  TM's normal, nose and mouth without ulcers or lesions  NECK: no adenopathy, no asymmetry, masses, or scars and thyroid normal to palpation  RESP: lungs clear to auscultation - no rales, rhonchi or wheezes  CV: regular rate and rhythm, normal S1 S2, no S3 or S4, no murmur, click or rub, no peripheral edema and peripheral pulses strong  ABDOMEN: soft, nontender, no hepatosplenomegaly, no masses and bowel sounds normal  MS: no gross musculoskeletal defects noted, no edema  NEURO: Normal strength and tone, mentation intact and speech normal    Diagnostic Test Results:  Labs reviewed in Epic        Assessment & Plan     1. Dizziness  Increase flonase to 2 sprays, continue exercise, use meclizine prn and see ENT.  - OTOLARYNGOLOGY REFERRAL     BMI:   Estimated body mass index is 34.29 kg/m  as calculated from the following:    Height as of 12/11/19: 1.829 m (6').    Weight as of this encounter: 114.7 kg (252 lb 12.8 oz).   Weight management plan: Discussed healthy diet and exercise guidelines      Return in about 2 weeks (around 3/18/2020), or if symptoms worsen or fail to improve.    Sri Singer MD  [facility]

## 2020-03-09 ENCOUNTER — OFFICE VISIT (OUTPATIENT)
Dept: AUDIOLOGY | Facility: CLINIC | Age: 40
End: 2020-03-09
Payer: COMMERCIAL

## 2020-03-09 ENCOUNTER — OFFICE VISIT (OUTPATIENT)
Dept: OTOLARYNGOLOGY | Facility: CLINIC | Age: 40
End: 2020-03-09
Payer: COMMERCIAL

## 2020-03-09 VITALS
DIASTOLIC BLOOD PRESSURE: 92 MMHG | BODY MASS INDEX: 34.26 KG/M2 | WEIGHT: 252.6 LBS | OXYGEN SATURATION: 99 % | HEART RATE: 69 BPM | SYSTOLIC BLOOD PRESSURE: 154 MMHG

## 2020-03-09 DIAGNOSIS — R42 DIZZINESS: Primary | ICD-10-CM

## 2020-03-09 DIAGNOSIS — H93.8X2 PRESSURE SENSATION IN LEFT EAR: Primary | ICD-10-CM

## 2020-03-09 DIAGNOSIS — H93.8X2 EAR FULLNESS, LEFT: ICD-10-CM

## 2020-03-09 DIAGNOSIS — R42 INTERMITTENT VERTIGO: ICD-10-CM

## 2020-03-09 PROCEDURE — 92550 TYMPANOMETRY & REFLEX THRESH: CPT | Performed by: AUDIOLOGIST

## 2020-03-09 PROCEDURE — 99207 ZZC NO CHARGE LOS: CPT | Performed by: AUDIOLOGIST

## 2020-03-09 PROCEDURE — 92557 COMPREHENSIVE HEARING TEST: CPT | Performed by: AUDIOLOGIST

## 2020-03-09 PROCEDURE — 99204 OFFICE O/P NEW MOD 45 MIN: CPT | Performed by: OTOLARYNGOLOGY

## 2020-03-09 RX ORDER — TRIAMTERENE/HYDROCHLOROTHIAZID 37.5-25 MG
0.5 TABLET ORAL DAILY
Qty: 23 TABLET | Refills: 3 | Status: SHIPPED | OUTPATIENT
Start: 2020-03-09 | End: 2020-07-15

## 2020-03-09 NOTE — PROGRESS NOTES
"History of Present Illness - Tony Ortega is a 40 year old male here to see me for the first time due to dizziness.    This problem started in December where he started to get issues with sudden changes of balance and feelings of nausea.  \"It felt like things were tilting,\" but no spinning sensation at all.  There was no prodromal or concurrent URI, ad no changes in medications or diet.  His head also felt foggy and off as well.    He reports that it slowly got better, but then it came back in February.  Since then the LEFT ear has continued to feel stuffy.  There has been no pain or drainage.  The hearing feels somewhat muffled in the LEFT as well.  No headache or changes in vision.    No history of ear disease, no ear surgery.  No family history of vertigo.    Past Medical History -   Patient Active Problem List   Diagnosis     Lipid screening     Obesity, Class I, BMI 30-34.9     Screening for diabetes mellitus     Cervicalgia     Neck pain on left side     Left shoulder pain     Chronic periscapular pain on left side       Current Medications -   Current Outpatient Medications:      fluticasone (FLONASE) 50 MCG/ACT nasal spray, Spray 2 sprays into both nostrils daily, Disp: , Rfl:      meclizine (ANTIVERT) 25 MG tablet, Take 1 tablet (25 mg) by mouth 3 times daily as needed for dizziness, Disp: 20 tablet, Rfl: 0    Allergies - No Known Allergies    Social History -   Social History     Socioeconomic History     Marital status:      Spouse name: Not on file     Number of children: Not on file     Years of education: Not on file     Highest education level: Not on file   Occupational History     Not on file   Social Needs     Financial resource strain: Not on file     Food insecurity     Worry: Not on file     Inability: Not on file     Transportation needs     Medical: Not on file     Non-medical: Not on file   Tobacco Use     Smoking status: Former Smoker     Packs/day: 1.50     Years: 10.00     Pack " years: 15.00     Types: Cigarettes     Start date: 2000     Last attempt to quit: 3/9/2012     Years since quittin.0     Smokeless tobacco: Never Used   Substance and Sexual Activity     Alcohol use: Yes     Comment: Occasionally     Drug use: No     Sexual activity: Yes     Partners: Female     Birth control/protection: None   Lifestyle     Physical activity     Days per week: Not on file     Minutes per session: Not on file     Stress: Not on file   Relationships     Social connections     Talks on phone: Not on file     Gets together: Not on file     Attends Gnosticism service: Not on file     Active member of club or organization: Not on file     Attends meetings of clubs or organizations: Not on file     Relationship status: Not on file     Intimate partner violence     Fear of current or ex partner: Not on file     Emotionally abused: Not on file     Physically abused: Not on file     Forced sexual activity: Not on file   Other Topics Concern     Parent/sibling w/ CABG, MI or angioplasty before 65F 55M? No   Social History Narrative     Not on file       Family History -   Family History   Problem Relation Age of Onset     Diabetes Paternal Grandfather      Hypertension Paternal Grandfather      Diabetes Maternal Grandfather      Coronary Artery Disease Maternal Grandfather      Hypertension Maternal Grandfather      Colon Cancer Maternal Grandfather      Thyroid Disease Father         Doctor thinks it related to a traumatic headinjury     Hyperlipidemia No family hx of      Cerebrovascular Disease No family hx of      Prostate Cancer No family hx of      Depression No family hx of      Anxiety Disorder No family hx of      Thyroid Disease No family hx of      Asthma No family hx of        Review of Systems - As per HPI and PMHx, otherwise 10+ system review of the head and neck, and general constitution is negative.    Physical Exam  BP (!) 152/99 (BP Location: Right arm, Patient Position: Sitting, Cuff  Size: Adult Large)   Pulse 69   Wt 114.6 kg (252 lb 9.6 oz)   SpO2 99%   BMI 34.26 kg/m      General - The patient is well nourished and well developed, and appears to have good nutritional status.  Alert and oriented to person and place, answers questions and cooperates with examination appropriately.   Head and Face - Normocephalic and atraumatic, with no gross asymmetry noted of the contour of the facial features.  The facial nerve is intact, with strong symmetric movements.  Voice and Breathing - The patient was breathing comfortably without the use of accessory muscles. There was no wheezing, stridor, or stertor.  The patients voice was clear and strong, and had appropriate pitch and quality.  Ears - The tympanic membranes are normal in appearance, bony landmarks are intact.  No retraction, perforation, or masses.  No fluid or purulence was seen in the external canal or the middle ear. No evidence of infection of the middle ear or external canal, cerumen was normal in appearance.  Eyes - Extraocular movements intact, and the pupils were reactive to light.  Sclera were not icteric or injected, conjunctiva were pink and moist.  Mouth - Examination of the oral cavity showed pink, healthy oral mucosa. No lesions or ulcerations noted.  The tongue was mobile and midline, and the dentition were in good condition.    Throat - The walls of the oropharynx were smooth, pink, moist, symmetric, and had no lesions or ulcerations.  The tonsillar pillars and soft palate were symmetric.  The uvula was midline on elevation.    Neck - Normal midline excursion of the laryngotracheal complex during swallowing.  Full range of motion on passive movement.  Palpation of the occipital, submental, submandibular, internal jugular chain, and supraclavicular nodes did not demonstrate any abnormal lymph nodes or masses.  The carotid pulse was palpable bilaterally.  Palpation of the thyroid was soft and smooth, with no nodules or goiter  appreciated.  The trachea was mobile and midline.  Nose - External contour is symmetric, no gross deflection or scars.  Nasal mucosa is pink and moist with no abnormal mucus.  The septum was midline and non-obstructive, turbinates of normal size and position.  No polyps, masses, or purulence noted on examination.    Audiologic Studies - An audiogram and tympanogram were performed today as part of the evaluation and personally reviewed. The tympanogram shows a normal Type A curve, with normal canal volume and middle ear pressure.  There is no sign of eustachian tube dysfunction or middle ear effusion.  The audiogram was also normal.  The sensorineural hearing was age-appropriate with the exception of a symmetric noise notch at 3000Hz, with no evidence of conductive hearing loss or significant asymmetry.      A/P - Tony Ortega is a 40 year old male  (R42) Dizziness  (primary encounter diagnosis)  (H93.8X2) Ear fullness, left    Even though the audiogram is normal, I do have strong suspicion base don his history that this is Meniere's.    I spent the remainder of today's visit educating the patient about the current theory on the cause of Meniere's Disease and the reasoning behind its treatment.  We discussed the CATS (Caffeine, Alcohol, Tobacco, Salt/Stress) Avoidance Diet, as well as safety measures to be done at home to avoid injury.  Finally, the variability, potential permanent hearing loss, and natural course of Meniere's disease, including 30% incidence of eventual bilateral involvement, was also discussed.    I will start him on a half tablet of Maxzide daily and see him back in one month.    The last item discussed was the possible indications for an MRI scan of the brain and internal auditory canals.  Although the chances of unilateral hearing loss and vertigo eventually being diagnosed as a vestibular schwannoma or other intracranial pathology was quoted as being approximately 1 in 400, consideration of  an MRI in the situation of persistent or worsening symptoms is still possible necessary.

## 2020-03-09 NOTE — LETTER
"    3/9/2020         RE: Tony Ortega  [address]  [place] [address]        Dear Colleague,    Thank you for referring your patient, Tony Ortega, to the [facility]. Please see a copy of my visit note below.    History of Present Illness - Tony Ortega is a 40 year old male here to see me for the first time due to dizziness.    This problem started in December where he started to get issues with sudden changes of balance and feelings of nausea.  \"It felt like things were tilting,\" but no spinning sensation at all.  There was no prodromal or concurrent URI, ad no changes in medications or diet.  His head also felt foggy and off as well.    He reports that it slowly got better, but then it came back in February.  Since then the LEFT ear has continued to feel stuffy.  There has been no pain or drainage.  The hearing feels somewhat muffled in the LEFT as well.  No headache or changes in vision.    No history of ear disease, no ear surgery.  No family history of vertigo.    Past Medical History -   Patient Active Problem List   Diagnosis     Lipid screening     Obesity, Class I, BMI 30-34.9     Screening for diabetes mellitus     Cervicalgia     Neck pain on left side     Left shoulder pain     Chronic periscapular pain on left side       Current Medications -   Current Outpatient Medications:      fluticasone (FLONASE) 50 MCG/ACT nasal spray, Spray 2 sprays into both nostrils daily, Disp: , Rfl:      meclizine (ANTIVERT) 25 MG tablet, Take 1 tablet (25 mg) by mouth 3 times daily as needed for dizziness, Disp: 20 tablet, Rfl: 0    Allergies - No Known Allergies    Social History -   Social History     Socioeconomic History     Marital status:      Spouse name: Not on file     Number of children: Not on file     Years of education: Not on file     Highest education level: Not on file   Occupational History     Not on file   Social Needs     Financial resource strain: Not " on file     Food insecurity     Worry: Not on file     Inability: Not on file     Transportation needs     Medical: Not on file     Non-medical: Not on file   Tobacco Use     Smoking status: Former Smoker     Packs/day: 1.50     Years: 10.00     Pack years: 15.00     Types: Cigarettes     Start date: 2000     Last attempt to quit: 3/9/2012     Years since quittin.0     Smokeless tobacco: Never Used   Substance and Sexual Activity     Alcohol use: Yes     Comment: Occasionally     Drug use: No     Sexual activity: Yes     Partners: Female     Birth control/protection: None   Lifestyle     Physical activity     Days per week: Not on file     Minutes per session: Not on file     Stress: Not on file   Relationships     Social connections     Talks on phone: Not on file     Gets together: Not on file     Attends Taoism service: Not on file     Active member of club or organization: Not on file     Attends meetings of clubs or organizations: Not on file     Relationship status: Not on file     Intimate partner violence     Fear of current or ex partner: Not on file     Emotionally abused: Not on file     Physically abused: Not on file     Forced sexual activity: Not on file   Other Topics Concern     Parent/sibling w/ CABG, MI or angioplasty before 65F 55M? No   Social History Narrative     Not on file       Family History -   Family History   Problem Relation Age of Onset     Diabetes Paternal Grandfather      Hypertension Paternal Grandfather      Diabetes Maternal Grandfather      Coronary Artery Disease Maternal Grandfather      Hypertension Maternal Grandfather      Colon Cancer Maternal Grandfather      Thyroid Disease Father         Doctor thinks it related to a traumatic headinjury     Hyperlipidemia No family hx of      Cerebrovascular Disease No family hx of      Prostate Cancer No family hx of      Depression No family hx of      Anxiety Disorder No family hx of      Thyroid Disease No family hx of       Asthma No family hx of        Review of Systems - As per HPI and PMHx, otherwise 10+ system review of the head and neck, and general constitution is negative.    Physical Exam  BP (!) 152/99 (BP Location: Right arm, Patient Position: Sitting, Cuff Size: Adult Large)   Pulse 69   Wt 114.6 kg (252 lb 9.6 oz)   SpO2 99%   BMI 34.26 kg/m      General - The patient is well nourished and well developed, and appears to have good nutritional status.  Alert and oriented to person and place, answers questions and cooperates with examination appropriately.   Head and Face - Normocephalic and atraumatic, with no gross asymmetry noted of the contour of the facial features.  The facial nerve is intact, with strong symmetric movements.  Voice and Breathing - The patient was breathing comfortably without the use of accessory muscles. There was no wheezing, stridor, or stertor.  The patients voice was clear and strong, and had appropriate pitch and quality.  Ears - The tympanic membranes are normal in appearance, bony landmarks are intact.  No retraction, perforation, or masses.  No fluid or purulence was seen in the external canal or the middle ear. No evidence of infection of the middle ear or external canal, cerumen was normal in appearance.  Eyes - Extraocular movements intact, and the pupils were reactive to light.  Sclera were not icteric or injected, conjunctiva were pink and moist.  Mouth - Examination of the oral cavity showed pink, healthy oral mucosa. No lesions or ulcerations noted.  The tongue was mobile and midline, and the dentition were in good condition.    Throat - The walls of the oropharynx were smooth, pink, moist, symmetric, and had no lesions or ulcerations.  The tonsillar pillars and soft palate were symmetric.  The uvula was midline on elevation.    Neck - Normal midline excursion of the laryngotracheal complex during swallowing.  Full range of motion on passive movement.  Palpation of the occipital,  submental, submandibular, internal jugular chain, and supraclavicular nodes did not demonstrate any abnormal lymph nodes or masses.  The carotid pulse was palpable bilaterally.  Palpation of the thyroid was soft and smooth, with no nodules or goiter appreciated.  The trachea was mobile and midline.  Nose - External contour is symmetric, no gross deflection or scars.  Nasal mucosa is pink and moist with no abnormal mucus.  The septum was midline and non-obstructive, turbinates of normal size and position.  No polyps, masses, or purulence noted on examination.    Audiologic Studies - An audiogram and tympanogram were performed today as part of the evaluation and personally reviewed. The tympanogram shows a normal Type A curve, with normal canal volume and middle ear pressure.  There is no sign of eustachian tube dysfunction or middle ear effusion.  The audiogram was also normal.  The sensorineural hearing was age-appropriate with the exception of a symmetric noise notch at 3000Hz, with no evidence of conductive hearing loss or significant asymmetry.      A/P - Tony Ortega is a 40 year old male  (R42) Dizziness  (primary encounter diagnosis)  (H93.8X2) Ear fullness, left    Even though the audiogram is normal, I do have strong suspicion base don his history that this is Meniere's.    I spent the remainder of today's visit educating the patient about the current theory on the cause of Meniere's Disease and the reasoning behind its treatment.  We discussed the CATS (Caffeine, Alcohol, Tobacco, Salt/Stress) Avoidance Diet, as well as safety measures to be done at home to avoid injury.  Finally, the variability, potential permanent hearing loss, and natural course of Meniere's disease, including 30% incidence of eventual bilateral involvement, was also discussed.    I will start him on a half tablet of Maxzide daily and see him back in one month.    The last item discussed was the possible indications for an MRI scan  of the brain and internal auditory canals.  Although the chances of unilateral hearing loss and vertigo eventually being diagnosed as a vestibular schwannoma or other intracranial pathology was quoted as being approximately 1 in 400, consideration of an MRI in the situation of persistent or worsening symptoms is still possible necessary.      Again, thank you for allowing me to participate in the care of your patient.        Sincerely,        Marcus Yeh MD

## 2020-03-09 NOTE — PROGRESS NOTES
.AUDIOLOGY REPORT:    Patient was referred to [facility] from ENT by Dr. Yeh for a hearing examination. Patient reports intermittent short duration episodes of true vertigo. Patient reports most of the episodes happen when he moves his eyes from reading to looking up or while driving in a car. Patient reports some aural fullness in his left ear. The dizziness and aural fullness has been present and noticeable since he had a left ear infection in December.      Testing:    Otoscopy:   Otoscopic exam indicates ears are clear of cerumen bilaterally     Tympanograms:    RIGHT: normal eardrum mobility     LEFT:   normal eardrum mobility    Reflexes (reported by stimulus ear): 1000 Hz  RIGHT: Ipsilateral is present at normal levels  RIGHT: Contralateral is present at normal levels  LEFT:   Ipsilateral is present at normal levels  LEFT:   Contralateral is present at normal levels    Thresholds:   Pure Tone Thresholds assessed using conventional audiometry with good  reliability from 250-8000 Hz bilaterally using insert earphones and circumaural headphones     RIGHT:  normal and borderline-normal hearing sensitivity for all frequencies tested.     LEFT:    normal and borderline-normal hearing sensitivity for all frequencies tested.    NOTE: change in transducers did not merit a change in thresholds.     Speech Reception Threshold:    RIGHT: 20 dB HL    LEFT:   5 dB HL    Word Recognition Score:     RIGHT: 100% at 60 dB HL using NU-6 recorded word list.    LEFT:   100% at 50 dB HL using NU-6 recorded word list.    Discussed results with the patient.     Patient was returned to ENT for follow up.     Joey León CCC-A  Licensed Audiologist  3/9/2020

## 2020-03-12 ENCOUNTER — MYC MEDICAL ADVICE (OUTPATIENT)
Dept: OTOLARYNGOLOGY | Facility: CLINIC | Age: 40
End: 2020-03-12

## 2020-04-13 ENCOUNTER — VIRTUAL VISIT (OUTPATIENT)
Dept: OTOLARYNGOLOGY | Facility: CLINIC | Age: 40
End: 2020-04-13
Payer: COMMERCIAL

## 2020-04-13 DIAGNOSIS — H81.02 MENIERE'S DISEASE, LEFT: Primary | ICD-10-CM

## 2020-04-13 DIAGNOSIS — R42 DIZZINESS: ICD-10-CM

## 2020-04-13 PROCEDURE — 99213 OFFICE O/P EST LOW 20 MIN: CPT | Mod: 95 | Performed by: OTOLARYNGOLOGY

## 2020-04-13 RX ORDER — TRIAMTERENE AND HYDROCHLOROTHIAZIDE 37.5; 25 MG/1; MG/1
1 CAPSULE ORAL EVERY MORNING
Qty: 90 CAPSULE | Refills: 3 | Status: SHIPPED | OUTPATIENT
Start: 2020-04-13 | End: 2020-08-11

## 2020-04-13 NOTE — PROGRESS NOTES
"TELEPHONE ENCOUNTER    History of Present Illness - Tony Ortega is a 40 year old male for a follow up from his initial visit on 3/9/2020 due to dizziness.    To review, this problem started in December where he started to get issues with sudden changes of balance and feelings of nausea.  \"It felt like things were tilting,\" but no spinning sensation at all.  There was no prodromal or concurrent URI, ad no changes in medications or diet.  His head also felt foggy and off as well.    He reported that it slowly got better, but then it came back in February.  Since then the LEFT ear has continued to feel stuffy.  There has been no pain or drainage.  The hearing feels somewhat muffled in the LEFT as well.  No headache or changes in vision.    No history of ear disease, no ear surgery.  No family history of vertigo.    So after the initial visit on 3/9/20 I felt strongly that this was LEFT Meniere's disease and started him on a trial dose of a half tablet of Maxzide daily.  He is also on flonase as well for post nasal drainage and chronic rhinitis.    Past Medical History -   Patient Active Problem List   Diagnosis     Lipid screening     Obesity, Class I, BMI 30-34.9     Screening for diabetes mellitus     Cervicalgia     Neck pain on left side     Left shoulder pain     Chronic periscapular pain on left side       Current Medications -   Current Outpatient Medications:      fluticasone (FLONASE) 50 MCG/ACT nasal spray, Spray 2 sprays into both nostrils daily, Disp: , Rfl:      triamterene-HCTZ (MAXZIDE-25) 37.5-25 MG tablet, Take 0.5 tablets by mouth daily, Disp: 23 tablet, Rfl: 3     meclizine (ANTIVERT) 25 MG tablet, Take 1 tablet (25 mg) by mouth 3 times daily as needed for dizziness (Patient not taking: Reported on 4/13/2020), Disp: 20 tablet, Rfl: 0    Allergies - No Known Allergies    Social History -   Social History     Socioeconomic History     Marital status:      Spouse name: Not on file     Number " of children: Not on file     Years of education: Not on file     Highest education level: Not on file   Occupational History     Not on file   Social Needs     Financial resource strain: Not on file     Food insecurity     Worry: Not on file     Inability: Not on file     Transportation needs     Medical: Not on file     Non-medical: Not on file   Tobacco Use     Smoking status: Former Smoker     Packs/day: 1.50     Years: 10.00     Pack years: 15.00     Types: Cigarettes     Start date: 2000     Last attempt to quit: 3/9/2012     Years since quittin.0     Smokeless tobacco: Never Used   Substance and Sexual Activity     Alcohol use: Yes     Comment: Occasionally     Drug use: No     Sexual activity: Yes     Partners: Female     Birth control/protection: None   Lifestyle     Physical activity     Days per week: Not on file     Minutes per session: Not on file     Stress: Not on file   Relationships     Social connections     Talks on phone: Not on file     Gets together: Not on file     Attends Muslim service: Not on file     Active member of club or organization: Not on file     Attends meetings of clubs or organizations: Not on file     Relationship status: Not on file     Intimate partner violence     Fear of current or ex partner: Not on file     Emotionally abused: Not on file     Physically abused: Not on file     Forced sexual activity: Not on file   Other Topics Concern     Parent/sibling w/ CABG, MI or angioplasty before 65F 55M? No   Social History Narrative     Not on file       Family History -   Family History   Problem Relation Age of Onset     Diabetes Paternal Grandfather      Hypertension Paternal Grandfather      Diabetes Maternal Grandfather      Coronary Artery Disease Maternal Grandfather      Hypertension Maternal Grandfather      Colon Cancer Maternal Grandfather      Thyroid Disease Father         Doctor thinks it related to a traumatic headinjury     Hyperlipidemia No family hx of       Cerebrovascular Disease No family hx of      Prostate Cancer No family hx of      Depression No family hx of      Anxiety Disorder No family hx of      Thyroid Disease No family hx of      Asthma No family hx of        Review of Systems - As per HPI and PMHx, otherwise 10+ system review of the head and neck, and general constitution is negative.      A/P - Tony Ortega is a 40 year old male  (R42) Dizziness  (primary encounter diagnosis)  (H93.8X2) Ear fullness, left    I have changed him to a full strength Dyazide capsule.    I suspect that perhaps his vauge nausea might actually be residual vestibular effects from the Meniere's as opposed to a side effect on the stomach from the medication itself.  Let's see if the higher dose in capsule form will continue to take care of the dizziness, but also resolve the nausea.  If it makes it worse, please contact me.    For his post nasal drainage, I have encouraged continued flonase use, but at bedtime.    ATTESTATION:  25 minutes were spent reviewing records, the audiogram, and in speaking the patient about his symptoms, medication response, and counseling on the new care/treatment plan.

## 2020-04-22 ENCOUNTER — MYC MEDICAL ADVICE (OUTPATIENT)
Dept: OTOLARYNGOLOGY | Facility: CLINIC | Age: 40
End: 2020-04-22

## 2020-04-27 ENCOUNTER — OFFICE VISIT (OUTPATIENT)
Dept: OTOLARYNGOLOGY | Facility: CLINIC | Age: 40
End: 2020-04-27
Payer: COMMERCIAL

## 2020-04-27 DIAGNOSIS — H93.8X2 EAR FULLNESS, LEFT: Primary | ICD-10-CM

## 2020-04-27 PROCEDURE — 99214 OFFICE O/P EST MOD 30 MIN: CPT | Performed by: OTOLARYNGOLOGY

## 2020-04-27 NOTE — LETTER
"    4/27/2020         RE: Tony Ortega  [address]  [place] [address]        Dear Colleague,    Thank you for referring your patient, Tony Ortega, to the [facility]. Please see a copy of my visit note below.    TELEPHONE ENCOUNTER    History of Present Illness - Tony Ortega is a 40 year old male for a follow up from his initial visit on 3/9/2020 due to dizziness, with a virtual visit on 4/13/2020.    To review, this problem started in December where he started to get issues with sudden changes of balance and feelings of nausea.  \"It felt like things were tilting,\" but no spinning sensation at all.  There was no prodromal or concurrent URI, ad no changes in medications or diet.  His head also felt foggy and off as well.  He reported that it slowly got better, but then it came back in February.  Since then the LEFT ear has continued to feel stuffy.  There has been no pain or drainage.  The hearing feels somewhat muffled in the LEFT as well.  No headache or changes in vision.    No history of ear disease, no ear surgery.  No family history of vertigo.    So after the initial visit on 3/9/20 I felt strongly that this was LEFT Meniere's disease and started him on a trial dose of a half tablet of Maxzide daily.  On ourr virtual visit on 4/13/20, there was still considerable fullness in the LEFT ear and the balance had notably improved.  Therefore I increased the dose to a full Dyazide capsule daily.      He contact me again with complaints of sore throat and post nasal drainage despite use of flonase, and was still feeling significantly better from a dizziness standpoint, so I asked him to follow up in person for a repeat audiogram and physical exam.    He does tell me that since talking to us last week, The LEFT ear popped and the sense of pressure has been better.  The off balance feeling is still much better.  And finally, the mild burning irritated sensation in the throat.  " As soon as he stopped the flonase, that throat symptom went away.  He also stopped the dyazide already because of upset stomach.    Past Medical History -   Patient Active Problem List   Diagnosis     Lipid screening     Obesity, Class I, BMI 30-34.9     Screening for diabetes mellitus     Cervicalgia     Neck pain on left side     Left shoulder pain     Chronic periscapular pain on left side     Dizziness     Meniere's disease, left       Current Medications -   Current Outpatient Medications:      fluticasone (FLONASE) 50 MCG/ACT nasal spray, Spray 2 sprays into both nostrils daily, Disp: , Rfl:      meclizine (ANTIVERT) 25 MG tablet, Take 1 tablet (25 mg) by mouth 3 times daily as needed for dizziness (Patient not taking: Reported on 2020), Disp: 20 tablet, Rfl: 0     triamterene-HCTZ (DYAZIDE) 37.5-25 MG capsule, Take 1 capsule by mouth every morning, Disp: 90 capsule, Rfl: 3     triamterene-HCTZ (MAXZIDE-25) 37.5-25 MG tablet, Take 0.5 tablets by mouth daily, Disp: 23 tablet, Rfl: 3    Allergies - No Known Allergies    Social History -   Social History     Socioeconomic History     Marital status:      Spouse name: Not on file     Number of children: Not on file     Years of education: Not on file     Highest education level: Not on file   Occupational History     Not on file   Social Needs     Financial resource strain: Not on file     Food insecurity     Worry: Not on file     Inability: Not on file     Transportation needs     Medical: Not on file     Non-medical: Not on file   Tobacco Use     Smoking status: Former Smoker     Packs/day: 1.50     Years: 10.00     Pack years: 15.00     Types: Cigarettes     Start date: 2000     Last attempt to quit: 3/9/2012     Years since quittin.0     Smokeless tobacco: Never Used   Substance and Sexual Activity     Alcohol use: Yes     Comment: Occasionally     Drug use: No     Sexual activity: Yes     Partners: Female     Birth control/protection:  None   Lifestyle     Physical activity     Days per week: Not on file     Minutes per session: Not on file     Stress: Not on file   Relationships     Social connections     Talks on phone: Not on file     Gets together: Not on file     Attends Scientologist service: Not on file     Active member of club or organization: Not on file     Attends meetings of clubs or organizations: Not on file     Relationship status: Not on file     Intimate partner violence     Fear of current or ex partner: Not on file     Emotionally abused: Not on file     Physically abused: Not on file     Forced sexual activity: Not on file   Other Topics Concern     Parent/sibling w/ CABG, MI or angioplasty before 65F 55M? No   Social History Narrative     Not on file       Family History -   Family History   Problem Relation Age of Onset     Diabetes Paternal Grandfather      Hypertension Paternal Grandfather      Diabetes Maternal Grandfather      Coronary Artery Disease Maternal Grandfather      Hypertension Maternal Grandfather      Colon Cancer Maternal Grandfather      Thyroid Disease Father         Doctor thinks it related to a traumatic headinjury     Hyperlipidemia No family hx of      Cerebrovascular Disease No family hx of      Prostate Cancer No family hx of      Depression No family hx of      Anxiety Disorder No family hx of      Thyroid Disease No family hx of      Asthma No family hx of        Review of Systems - As per HPI and PMHx, otherwise 10+ system review of the head and neck, and general constitution is negative.    Physical Exam  There were no vitals taken for this visit.    General - The patient is well nourished and well developed, and appears to have good nutritional status.  Alert and oriented to person and place, answers questions and cooperates with examination appropriately.   Head and Face - Normocephalic and atraumatic, with no gross asymmetry noted of the contour of the facial features.  The facial nerve is  intact, with strong symmetric movements.  Voice and Breathing - The patient was breathing comfortably without the use of accessory muscles. There was no wheezing, stridor, or stertor.  The patients voice was clear and strong, and had appropriate pitch and quality.  Ears - The tympanic membranes are normal in appearance, bony landmarks are intact.  No retraction, perforation, or masses.  No fluid or purulence was seen in the external canal or the middle ear. No evidence of infection of the middle ear or external canal, cerumen was normal in appearance.  Eyes - Extraocular movements intact, and the pupils were reactive to light.  Sclera were not icteric or injected, conjunctiva were pink and moist.  Mouth - Examination of the oral cavity showed pink, healthy oral mucosa. No lesions or ulcerations noted.  The tongue was mobile and midline, and the dentition were in good condition.    Throat - The walls of the oropharynx were smooth, pink, moist, symmetric, and had no lesions or ulcerations.  The tonsillar pillars and soft palate were symmetric.  The uvula was midline on elevation.    Neck - Normal midline excursion of the laryngotracheal complex during swallowing.  Full range of motion on passive movement.  Palpation of the occipital, submental, submandibular, internal jugular chain, and supraclavicular nodes did not demonstrate any abnormal lymph nodes or masses.  The carotid pulse was palpable bilaterally.  Palpation of the thyroid was soft and smooth, with no nodules or goiter appreciated.  The trachea was mobile and midline.  Nose - External contour is symmetric, no gross deflection or scars.  Nasal mucosa is pink and moist with no abnormal mucus.  The septum was midline and non-obstructive, turbinates of normal size and position.  No polyps, masses, or purulence noted on examination.    Audiologic Studies - A new audiogram and tympanogram were performed today as part of the evaluation and personally reviewed. The  tympanogram shows a normal Type A curve, with normal canal volume and middle ear pressure.  There is no sign of eustachian tube dysfunction or middle ear effusion.  The audiogram was also normal.  The sensorineural hearing was age-appropriate with the exception of a symmetric noise notch at 3000Hz, no change since 3/9/20, with no evidence of conductive hearing loss or significant asymmetry.      A/P - Tony Ortega is a 40 year old male  (R42) Dizziness  (primary encounter diagnosis)  (H93.8X2) Ear fullness, left    The dizziness is still gone, and the ear pressure is better    I suspect he had eustachian tube dysfunction and the negative pressure was actually the cause of his dizziness.  Both are better now, so stay off the dyazide, and stay off the flonase.    With regards to the throat symptoms, it has been better since stopping flonase.  So I do suspect that perhaps the flonase was exacerbating things.  If the throat symptoms  Do return, I will prescribe omeprazole, as I think he has untreated laryngopharyngeal reflux.      Again, thank you for allowing me to participate in the care of your patient.        Sincerely,        Marcus Yeh MD

## 2020-04-27 NOTE — PROGRESS NOTES
"TELEPHONE ENCOUNTER    History of Present Illness - Tony Ortega is a 40 year old male for a follow up from his initial visit on 3/9/2020 due to dizziness, with a virtual visit on 4/13/2020.    To review, this problem started in December where he started to get issues with sudden changes of balance and feelings of nausea.  \"It felt like things were tilting,\" but no spinning sensation at all.  There was no prodromal or concurrent URI, ad no changes in medications or diet.  His head also felt foggy and off as well.  He reported that it slowly got better, but then it came back in February.  Since then the LEFT ear has continued to feel stuffy.  There has been no pain or drainage.  The hearing feels somewhat muffled in the LEFT as well.  No headache or changes in vision.    No history of ear disease, no ear surgery.  No family history of vertigo.    So after the initial visit on 3/9/20 I felt strongly that this was LEFT Meniere's disease and started him on a trial dose of a half tablet of Maxzide daily.  On ourr virtual visit on 4/13/20, there was still considerable fullness in the LEFT ear and the balance had notably improved.  Therefore I increased the dose to a full Dyazide capsule daily.      He contact me again with complaints of sore throat and post nasal drainage despite use of flonase, and was still feeling significantly better from a dizziness standpoint, so I asked him to follow up in person for a repeat audiogram and physical exam.    He does tell me that since talking to us last week, The LEFT ear popped and the sense of pressure has been better.  The off balance feeling is still much better.  And finally, the mild burning irritated sensation in the throat.  As soon as he stopped the flonase, that throat symptom went away.  He also stopped the dyazide already because of upset stomach.    Past Medical History -   Patient Active Problem List   Diagnosis     Lipid screening     Obesity, Class I, BMI 30-34.9 "     Screening for diabetes mellitus     Cervicalgia     Neck pain on left side     Left shoulder pain     Chronic periscapular pain on left side     Dizziness     Meniere's disease, left       Current Medications -   Current Outpatient Medications:      fluticasone (FLONASE) 50 MCG/ACT nasal spray, Spray 2 sprays into both nostrils daily, Disp: , Rfl:      meclizine (ANTIVERT) 25 MG tablet, Take 1 tablet (25 mg) by mouth 3 times daily as needed for dizziness (Patient not taking: Reported on 2020), Disp: 20 tablet, Rfl: 0     triamterene-HCTZ (DYAZIDE) 37.5-25 MG capsule, Take 1 capsule by mouth every morning, Disp: 90 capsule, Rfl: 3     triamterene-HCTZ (MAXZIDE-25) 37.5-25 MG tablet, Take 0.5 tablets by mouth daily, Disp: 23 tablet, Rfl: 3    Allergies - No Known Allergies    Social History -   Social History     Socioeconomic History     Marital status:      Spouse name: Not on file     Number of children: Not on file     Years of education: Not on file     Highest education level: Not on file   Occupational History     Not on file   Social Needs     Financial resource strain: Not on file     Food insecurity     Worry: Not on file     Inability: Not on file     Transportation needs     Medical: Not on file     Non-medical: Not on file   Tobacco Use     Smoking status: Former Smoker     Packs/day: 1.50     Years: 10.00     Pack years: 15.00     Types: Cigarettes     Start date: 2000     Last attempt to quit: 3/9/2012     Years since quittin.0     Smokeless tobacco: Never Used   Substance and Sexual Activity     Alcohol use: Yes     Comment: Occasionally     Drug use: No     Sexual activity: Yes     Partners: Female     Birth control/protection: None   Lifestyle     Physical activity     Days per week: Not on file     Minutes per session: Not on file     Stress: Not on file   Relationships     Social connections     Talks on phone: Not on file     Gets together: Not on file     Attends Latter-day  service: Not on file     Active member of club or organization: Not on file     Attends meetings of clubs or organizations: Not on file     Relationship status: Not on file     Intimate partner violence     Fear of current or ex partner: Not on file     Emotionally abused: Not on file     Physically abused: Not on file     Forced sexual activity: Not on file   Other Topics Concern     Parent/sibling w/ CABG, MI or angioplasty before 65F 55M? No   Social History Narrative     Not on file       Family History -   Family History   Problem Relation Age of Onset     Diabetes Paternal Grandfather      Hypertension Paternal Grandfather      Diabetes Maternal Grandfather      Coronary Artery Disease Maternal Grandfather      Hypertension Maternal Grandfather      Colon Cancer Maternal Grandfather      Thyroid Disease Father         Doctor thinks it related to a traumatic headinjury     Hyperlipidemia No family hx of      Cerebrovascular Disease No family hx of      Prostate Cancer No family hx of      Depression No family hx of      Anxiety Disorder No family hx of      Thyroid Disease No family hx of      Asthma No family hx of        Review of Systems - As per HPI and PMHx, otherwise 10+ system review of the head and neck, and general constitution is negative.    Physical Exam  There were no vitals taken for this visit.    General - The patient is well nourished and well developed, and appears to have good nutritional status.  Alert and oriented to person and place, answers questions and cooperates with examination appropriately.   Head and Face - Normocephalic and atraumatic, with no gross asymmetry noted of the contour of the facial features.  The facial nerve is intact, with strong symmetric movements.  Voice and Breathing - The patient was breathing comfortably without the use of accessory muscles. There was no wheezing, stridor, or stertor.  The patients voice was clear and strong, and had appropriate pitch and  quality.  Ears - The tympanic membranes are normal in appearance, bony landmarks are intact.  No retraction, perforation, or masses.  No fluid or purulence was seen in the external canal or the middle ear. No evidence of infection of the middle ear or external canal, cerumen was normal in appearance.  Eyes - Extraocular movements intact, and the pupils were reactive to light.  Sclera were not icteric or injected, conjunctiva were pink and moist.  Mouth - Examination of the oral cavity showed pink, healthy oral mucosa. No lesions or ulcerations noted.  The tongue was mobile and midline, and the dentition were in good condition.    Throat - The walls of the oropharynx were smooth, pink, moist, symmetric, and had no lesions or ulcerations.  The tonsillar pillars and soft palate were symmetric.  The uvula was midline on elevation.    Neck - Normal midline excursion of the laryngotracheal complex during swallowing.  Full range of motion on passive movement.  Palpation of the occipital, submental, submandibular, internal jugular chain, and supraclavicular nodes did not demonstrate any abnormal lymph nodes or masses.  The carotid pulse was palpable bilaterally.  Palpation of the thyroid was soft and smooth, with no nodules or goiter appreciated.  The trachea was mobile and midline.  Nose - External contour is symmetric, no gross deflection or scars.  Nasal mucosa is pink and moist with no abnormal mucus.  The septum was midline and non-obstructive, turbinates of normal size and position.  No polyps, masses, or purulence noted on examination.    Audiologic Studies - A new audiogram and tympanogram were performed today as part of the evaluation and personally reviewed. The tympanogram shows a normal Type A curve, with normal canal volume and middle ear pressure.  There is no sign of eustachian tube dysfunction or middle ear effusion.  The audiogram was also normal.  The sensorineural hearing was age-appropriate with the  exception of a symmetric noise notch at 3000Hz, no change since 3/9/20, with no evidence of conductive hearing loss or significant asymmetry.      A/P - Tony Ortega is a 40 year old male  (R42) Dizziness  (primary encounter diagnosis)  (H93.8X2) Ear fullness, left    The dizziness is still gone, and the ear pressure is better    I suspect he had eustachian tube dysfunction and the negative pressure was actually the cause of his dizziness.  Both are better now, so stay off the dyazide, and stay off the flonase.    With regards to the throat symptoms, it has been better since stopping flonase.  So I do suspect that perhaps the flonase was exacerbating things.  If the throat symptoms  Do return, I will prescribe omeprazole, as I think he has untreated laryngopharyngeal reflux.

## 2020-07-15 ENCOUNTER — VIRTUAL VISIT (OUTPATIENT)
Dept: FAMILY MEDICINE | Facility: CLINIC | Age: 40
End: 2020-07-15
Payer: COMMERCIAL

## 2020-07-15 DIAGNOSIS — I10 HYPERTENSION GOAL BP (BLOOD PRESSURE) < 140/90: Primary | ICD-10-CM

## 2020-07-15 DIAGNOSIS — Z31.41 FERTILITY TESTING: ICD-10-CM

## 2020-07-15 PROBLEM — H81.02 MENIERE'S DISEASE, LEFT: Status: RESOLVED | Noted: 2020-04-13 | Resolved: 2020-07-15

## 2020-07-15 PROCEDURE — 99214 OFFICE O/P EST MOD 30 MIN: CPT | Mod: 95 | Performed by: FAMILY MEDICINE

## 2020-07-15 RX ORDER — LISINOPRIL 10 MG/1
10 TABLET ORAL DAILY
Qty: 30 TABLET | Refills: 1 | Status: SHIPPED | OUTPATIENT
Start: 2020-07-15 | End: 2020-08-11

## 2020-07-15 NOTE — PROGRESS NOTES
"Tony Ortega is a 40 year old male who is being evaluated via a billable video visit.      The patient has been notified of following:     \"This video visit will be conducted via a call between you and your physician/provider. We have found that certain health care needs can be provided without the need for an in-person physical exam.  This service lets us provide the care you need with a video conversation.  If a prescription is necessary we can send it directly to your pharmacy.  If lab work is needed we can place an order for that and you can then stop by our lab to have the test done at a later time.    Video visits are billed at different rates depending on your insurance coverage.  Please reach out to your insurance provider with any questions.    If during the course of the call the physician/provider feels a video visit is not appropriate, you will not be charged for this service.\"    Patient has given verbal consent for Video visit? Yes  How would you like to obtain your AVS? [employer]  Patient would like the video invitation sent by: Text to cell phone: [phone]  Will anyone else be joining your video visit? No    Subjective     Tony Ortega is a 40 year old male who presents today via video visit for the following health issues:    HPI    Hypertension Follow-up      Do you check your blood pressure regularly outside of the clinic? Yes occasionnaly hasn't checked it in a couple days last check 150/90    Are you following a low salt diet? Yes    Are your blood pressures ever more than 140 on the top number (systolic) OR more   than 90 on the bottom number (diastolic), for example 140/90? Yes      How many servings of fruits and vegetables do you eat daily?  4 or more    On average, how many sweetened beverages do you drink each day (Examples: soda, juice, sweet tea, etc.  Do NOT count diet or artificially sweetened beverages)?   0    How many days per week do you exercise enough to make your heart " beat faster? 4    How many minutes a day do you exercise enough to make your heart beat faster? 30 - 60    How many days per week do you miss taking your medication? 0      Video Start Time: 8:04 AM    Fertility issues for the last 2 years.  He and his wife do have one other child but also had a miscarriage after this.      Reviewed and updated as needed this visit by Provider  Tobacco  Allergies  Meds  Problems  Med Hx  Surg Hx  Fam Hx         Review of Systems   Constitutional, HEENT, cardiovascular, pulmonary, gi and gu systems are negative, except as otherwise noted.      Objective             Physical Exam     GENERAL: Healthy, alert and no distress, obese  EYES: Eyes grossly normal to inspection.  No discharge or erythema, or obvious scleral/conjunctival abnormalities.  RESP: No audible wheeze, cough, or visible cyanosis.  No visible retractions or increased work of breathing.    SKIN: Visible skin clear. No significant rash, abnormal pigmentation or lesions.  NEURO: Cranial nerves grossly intact.  Mentation and speech appropriate for age.  PSYCH: Mentation appears normal, affect normal/bright, judgement and insight intact, normal speech and appearance well-groomed.      Diagnostic Test Results:  Labs reviewed in Epic        Assessment & Plan     1. Hypertension goal BP (blood pressure) < 140/90  New - trial of lifestyle changes, add lisinopril and lab and bp check in 1 month  - lisinopril (ZESTRIL) 10 MG tablet; Take 1 tablet (10 mg) by mouth daily  Dispense: 30 tablet; Refill: 1  - Basic metabolic panel; Future    2. Fertility testing  Referral for evaluation. Wife to talk with Ob/Gyn  - INFERTILITY/AI REFERRAL   The uses and side effects, including black box warnings as appropriate, were discussed in detail.  All patient questions were answered.  The patient was instructed to call immediately if any side effects developed.     Return in about 4 weeks (around 8/12/2020) for BP Recheck, Lab Work,  Ancillary Visit.    Sri Singer MD  [facility]      Video-Visit Details    Type of service:  Video Visit    Video End Time:8:29 AM    Originating Location (pt. Location): Home    Distant Location (provider location):  [facility]     Platform used for Video Visit: AmWell    Return in about 4 weeks (around 8/12/2020) for BP Recheck, Lab Work, Ancillary Visit.       Sri Singer MD

## 2020-07-15 NOTE — PATIENT INSTRUCTIONS
At [facility], we strive to deliver an exceptional experience to you, every time we see you. If you receive a survey, please complete it as we do value your feedback.  If you have MyChart, you can expect to receive results automatically within 24 hours of their completion.  Your provider will send a note interpreting your results as well.   If you do not have MyChart, you should receive your results in about a week by mail.    Your care team:                            Family Medicine Internal Medicine   MD William Tsai MD Shantel Branch-Fleming, MD Katya Georgiev PA-C Megan Hill, APRJÚNIOR Best, MD Pediatrics   José Miguel Harrell, PAKENROY Martinez, MD Lynn Solis APRN CNP   MD Betzaida Gongora MD Deborah Mielke, MD Kim Thein, APRN [facility]      Clinic hours: Monday - Thursday 7 am-7 pm; Fridays 7 am-5 pm.   Urgent care: Monday - Friday 11 am-9 pm; Saturday and Sunday 9 am-5 pm.    Clinic: [phone]       [facility]: Monday - Thursday 8 am - 7 pm; Friday 8 am - 6 pm  [facility]: [phone]     Use [email or web address] for 24/7 diagnosis and treatment of dozens of conditions.   Patient Education     Controlling High Blood Pressure  High blood pressure (hypertension) is often called the silent killer. This is because many people who have it, don t know it. High blood pressure can raise your risk of heart attack, stroke, heart disease, and heart failure. Controlling your blood pressure can decrease your risk of these problems. Know your blood pressure and remember to check it regularly. Doing so can save your life.  Blood pressure measurements are given as 2 numbers. Systolic blood pressure is the upper number. This is the pressure when the heart contracts. Diastolic blood pressure is the lower number. This is the pressure when the heart relaxes between beats.  Blood pressure is  categorized as normal, elevated, or stage 1 or stage 2 high blood pressure:    Normal blood pressure is systolic of less than 120 and diastolic of less than 80 (120/80)    Elevated blood pressure is systolic of 120 to 129 and diastolic less than 80    Stage 1 high blood pressure is systolic is 130 to 139 or diastolic between 80 to 89    Stage 2 high blood pressure is when systolic is 140 or higher or the diastolic is 90 or higher  Here are some things you can do to help control your blood pressure.    Choose heart-healthy foods    Select low-salt, low-fat foods. Limit sodium intake to 2,400 mg per day or the amount suggested by your healthcare provider.    Limit canned, dried, cured, packaged, and fast foods. These can contain a lot of salt.    Eat 8 to 10 servings of fruits and vegetables every day.    Choose lean meats, fish, or chicken.    Eat whole-grain pasta, brown rice, and beans.    Eat 2 to 3 servings of low-fat or fat-free dairy products.    Ask your doctor about the DASH eating plan. This plan helps reduce blood pressure.    When you go to a restaurant, ask that your meal be prepared with no added salt.    Maintain a healthy weight    Ask your healthcare provider how many calories to eat a day. Then stick to that number.    Ask your healthcare provider what weight range is healthiest for you. If you are overweight, a weight loss of only 3% to 5% of your body weight can help lower blood pressure. Generally, a good weight loss goal is to lose 10% of your body weight in a year.    Limit snacks and sweets.    Get regular exercise.    Get up and get active    Choose activities you enjoy. Find ones you can do with friends or family. This includes bicycling, dancing, walking, and jogging.    Park farther away from building entrances.    Use stairs instead of the elevator.    When you can, walk or bike instead of driving.    [place] leaves, garden, or do household repairs.    Be active at a moderate to vigorous  level of physical activity for at least 40 minutes for a minimum of 3 to 4 days a week.     Manage stress    Make time to relax and enjoy life. Find time to laugh.    Communicate your concerns with your loved ones and your healthcare provider.    Visit with family and friends, and keep up with hobbies.    Limit alcohol and quit smoking    Men should have no more than 2 drinks per day.    Women should have no more than 1 drink per day.    Talk with your healthcare provider about quitting smoking. Smoking significantly increases your risk for heart disease and stroke. Ask your healthcare provider about community smoking cessation programs and other options.    Medicines  If lifestyle changes aren t enough, your healthcare provider may prescribe high blood pressure medicine. Take all medicines as prescribed. If you have any questions about your medicines, ask your healthcare provider before stopping or changing them.  Date Last Reviewed: 4/27/2016 2000-2019 [employer]. [address] [place] [address], [place] [address]. All rights reserved. This information is not intended as a substitute for professional medical care. Always follow your healthcare professional's instructions.

## 2020-08-11 ENCOUNTER — VIRTUAL VISIT (OUTPATIENT)
Dept: FAMILY MEDICINE | Facility: CLINIC | Age: 40
End: 2020-08-11
Payer: COMMERCIAL

## 2020-08-11 VITALS — WEIGHT: 249 LBS | BODY MASS INDEX: 33.77 KG/M2

## 2020-08-11 DIAGNOSIS — Z31.41 FERTILITY TESTING: Primary | ICD-10-CM

## 2020-08-11 DIAGNOSIS — I10 HYPERTENSION GOAL BP (BLOOD PRESSURE) < 140/90: ICD-10-CM

## 2020-08-11 DIAGNOSIS — E66.01 MORBID OBESITY (H): ICD-10-CM

## 2020-08-11 PROCEDURE — 99214 OFFICE O/P EST MOD 30 MIN: CPT | Mod: 95 | Performed by: FAMILY MEDICINE

## 2020-08-11 RX ORDER — LISINOPRIL 10 MG/1
10 TABLET ORAL DAILY
Qty: 30 TABLET | Refills: 1 | COMMUNITY
Start: 2020-08-11 | End: 2020-09-11

## 2020-08-11 NOTE — PROGRESS NOTES
"Tony Ortega is a 40 year old male who is being evaluated via a billable video visit.      The patient has been notified of following:     \"This video visit will be conducted via a call between you and your physician/provider. We have found that certain health care needs can be provided without the need for an in-person physical exam.  This service lets us provide the care you need with a video conversation.  If a prescription is necessary we can send it directly to your pharmacy.  If lab work is needed we can place an order for that and you can then stop by our lab to have the test done at a later time.    Video visits are billed at different rates depending on your insurance coverage.  Please reach out to your insurance provider with any questions.    If during the course of the call the physician/provider feels a video visit is not appropriate, you will not be charged for this service.\"    Patient has given verbal consent for Video visit? Yes  How would you like to obtain your AVS? MyChart  If you are dropped from the video visit, the video invite should be resent to: Text to cell phone: Cell phone [phone]  Will anyone else be joining your video visit? No    Subjective     Tony Ortega is a 40 year old male who presents today via video visit for the following health issues:    HPI    Infertility       Duration: 3 years     Description (location/character/radiation): patient is trying to conceive with wife have been trying to 3 years currently have one child     Accompanying signs and symptoms: n/a    History (similar episodes/previous evaluation): None - have not tried any previous testing     Precipitating or alleviating factors: None    Therapies tried and outcome: wife is having some blood work       has 3 yo child at home.  Now been trying with his SO for past 2.6 yo and has not conceived.  She has been to her OBG/GYN   And he was advised to get semen analysis    Has hypertension- home Blood " pressure apparatus shows better reading  Has been on lisinopril- and seen by primary care physicain Branch Sri Singer July 2020-virtual visit and has labs taniya for tomorrow.   Over all in usual state of good health        Video Start Time: 1:27PM        BP Readings from Last 3 Encounters:   03/09/20 (!) 154/92   03/04/20 136/88   12/11/19 (!) 148/93    Wt Readings from Last 3 Encounters:   08/11/20 112.9 kg (249 lb)   03/09/20 114.6 kg (252 lb 9.6 oz)   03/04/20 114.7 kg (252 lb 12.8 oz)                    Reviewed and updated as needed this visit by Provider         Review of Systems   Constitutional, HEENT, cardiovascular, pulmonary, GI, , musculoskeletal, neuro, skin, endocrine and psych systems are negative, except as otherwise noted.      Objective             Physical Exam     GENERAL: Healthy, alert and no distress  EYES: Eyes grossly normal to inspection.  No discharge or erythema, or obvious scleral/conjunctival abnormalities.  RESP: No audible wheeze, cough, or visible cyanosis.  No visible retractions or increased work of breathing.    SKIN: Visible skin clear. No significant rash, abnormal pigmentation or lesions.  NEURO: Cranial nerves grossly intact.  Mentation and speech appropriate for age.  PSYCH: Mentation appears normal, affect normal/bright, judgement and insight intact, normal speech and appearance well-groomed.      Diagnostic Test Results:  Labs reviewed in Epic  none         Assessment & Plan       ICD-10-CM    1. Fertility testing  Z31.41 SEMEN ANALYSIS; MOTILITY & COUNT    semen analysis- information- discussed and will review the labs when avaliable   2. Morbid obesity (H)  E66.01     patient reports stable weight.   3. Hypertension goal BP (blood pressure) < 140/90  I10 lisinopril (ZESTRIL) 10 MG tablet    takes lisinopril & advised to FUP with PCP . target BP < 139/89. encouarged low salt diet. encouarged ex and physical activity of choice.              Return if symptoms  worsen or fail to improve, for concerns,unresolved.    Renetta Mann MD  [facility]      Video-Visit Details    Type of service:  Video Visit    Video End Time:1:37 PM    Originating Location (pt. Clpce4pyr): Home    Distant Location (provider location):  [facility]     Platform used for Video Visit: AmWell    Return if symptoms worsen or fail to improve, for concerns,unresolved.       Renetta Mann MD

## 2020-08-12 DIAGNOSIS — I10 HYPERTENSION GOAL BP (BLOOD PRESSURE) < 140/90: ICD-10-CM

## 2020-08-12 LAB
ANION GAP SERPL CALCULATED.3IONS-SCNC: 6 MMOL/L (ref 3–14)
BUN SERPL-MCNC: 17 MG/DL (ref 7–30)
CALCIUM SERPL-MCNC: 8.7 MG/DL (ref 8.5–10.1)
CHLORIDE SERPL-SCNC: 109 MMOL/L (ref 94–109)
CO2 SERPL-SCNC: 26 MMOL/L (ref 20–32)
CREAT SERPL-MCNC: 1.13 MG/DL (ref 0.66–1.25)
GFR SERPL CREATININE-BSD FRML MDRD: 81 ML/MIN/{1.73_M2}
GLUCOSE SERPL-MCNC: 107 MG/DL (ref 70–99)
POTASSIUM SERPL-SCNC: 4 MMOL/L (ref 3.4–5.3)
SODIUM SERPL-SCNC: 141 MMOL/L (ref 133–144)

## 2020-08-12 PROCEDURE — 80048 BASIC METABOLIC PNL TOTAL CA: CPT | Performed by: FAMILY MEDICINE

## 2020-08-12 PROCEDURE — 36415 COLL VENOUS BLD VENIPUNCTURE: CPT | Performed by: FAMILY MEDICINE

## 2020-08-12 NOTE — RESULT ENCOUNTER NOTE
Mr. Ortega,    Your kidney function was normal.    Please contact the clinic if you have additional questions.  Thank you.    Sincerely,    Sri Singer MD

## 2020-08-28 DIAGNOSIS — Z31.41 ENCOUNTER FOR SPERM COUNT FOR FERTILITY TESTING: Primary | ICD-10-CM

## 2020-08-28 PROCEDURE — 89322 SEMEN ANAL STRICT CRITERIA: CPT

## 2020-08-31 LAB
ABNORMAL SPERM: 94 MORPHOLOGY
ABSTINENCE DAYS: 2 DAYS (ref 2–7)
AGGLUTINATION: NO YES/NO
ANALYSIS TEMP - CENTIGRADE: 25 CENTIGRADE
CELL FRAGMENTS: NORMAL %
COLLECTION METHOD: NORMAL
COLLECTION SITE: NORMAL
CONSENT TO RELEASE TO PARTNER: YES
HEAD DEFECT: 95
IMMATURE SPERM: NORMAL %
IMMOTILE: 13 %
LAB RECEIPT TIME: NORMAL
LIQUEFIED: YES YES/NO
MIDPIECE DEFECT: 26
NON-PROGRESSIVE MOTILITY: 2 %
NORMAL SPERM: 6 % NORMAL FORMS (ref 4–?)
PROGRESSIVE MOTILITY: 85 % (ref 32–?)
ROUND CELLS: 0.3 MILLION/ML (ref ?–2)
SPECIMEN CONCENTRATION: 108 MILLION/ML (ref 15–?)
SPECIMEN PH: 8 PH (ref 7.2–?)
SPECIMEN TYPE: NORMAL
SPECIMEN VOL UR: 1.5 ML (ref 1.5–?)
TAIL DEFECT: 3
TIME OF ANALYSIS: NORMAL
TOTAL NUMBER: 162 MILLION (ref 39–?)
TOTAL PROGRESSIVE MOTILE: 138 MILLION (ref 15.6–?)
VISCOUS: NO YES/NO
VITALITY: NORMAL % (ref 58–?)
WBC SPECIMEN: NORMAL %

## 2020-09-11 ENCOUNTER — MYC REFILL (OUTPATIENT)
Dept: FAMILY MEDICINE | Facility: CLINIC | Age: 40
End: 2020-09-11

## 2020-09-11 DIAGNOSIS — I10 HYPERTENSION GOAL BP (BLOOD PRESSURE) < 140/90: ICD-10-CM

## 2020-09-14 RX ORDER — LISINOPRIL 10 MG/1
10 TABLET ORAL DAILY
Qty: 30 TABLET | Refills: 1 | Status: SHIPPED | OUTPATIENT
Start: 2020-09-14 | End: 2020-09-15

## 2020-09-14 NOTE — TELEPHONE ENCOUNTER
Routing refill request to provider for review/approval because:  Blood pressure not under 140/90 in past 12 months      Please also see My Chart comment from patient in My Chart refill request 9/11/20    Elvia Ayala RN

## 2020-09-15 ENCOUNTER — ALLIED HEALTH/NURSE VISIT (OUTPATIENT)
Dept: FAMILY MEDICINE | Facility: CLINIC | Age: 40
End: 2020-09-15
Payer: COMMERCIAL

## 2020-09-15 ENCOUNTER — MYC MEDICAL ADVICE (OUTPATIENT)
Dept: FAMILY MEDICINE | Facility: CLINIC | Age: 40
End: 2020-09-15

## 2020-09-15 VITALS — SYSTOLIC BLOOD PRESSURE: 131 MMHG | DIASTOLIC BLOOD PRESSURE: 85 MMHG

## 2020-09-15 DIAGNOSIS — I10 HYPERTENSION GOAL BP (BLOOD PRESSURE) < 140/90: ICD-10-CM

## 2020-09-15 DIAGNOSIS — I10 HYPERTENSION GOAL BP (BLOOD PRESSURE) < 140/90: Primary | ICD-10-CM

## 2020-09-15 RX ORDER — LISINOPRIL 10 MG/1
10 TABLET ORAL DAILY
Qty: 90 TABLET | Refills: 1 | Status: SHIPPED | OUTPATIENT
Start: 2020-09-15 | End: 2021-03-03

## 2020-12-13 ENCOUNTER — HEALTH MAINTENANCE LETTER (OUTPATIENT)
Age: 40
End: 2020-12-13

## 2021-01-16 ENCOUNTER — TELEPHONE (OUTPATIENT)
Dept: FAMILY MEDICINE | Facility: CLINIC | Age: 41
End: 2021-01-16

## 2021-01-16 NOTE — LETTER
Tony Ortega  [address] [id number]          01/22/21      Dear Tony Ortega        At [facility] we care about your health and are committed to providing quality patient care. Regular appointments are a vital part of the care and management of your health and can help prevent many of the complications that can occur.      It has come to our attention that you are due for an office visit or start an evisit through your My Chart account for this. Please call [facility] at [phone] soon to schedule your appointment.    If you have transferred care to another clinic please call to inform us so that we do not continue to send you reminder letters.      Sincerely,      [facility] Care Team

## 2021-01-16 NOTE — TELEPHONE ENCOUNTER
"Patient sent a web request:    \"My dentist gave me a referral to see a TMJ specialist for popping in the left side of my jaw. Unfortunately the specialist I was referred to is out of network. Is there a TMJ specialist at [facility] I can see?\"    ENT?      Patient would like to schedule with [facility].    Please contact patient.    Thank you.    Central Scheduling  Tena CURTIS.  "

## 2021-01-18 NOTE — TELEPHONE ENCOUNTER
This writer attempted to contact pt on 01/18/21      Reason for call schedule OV or have pt schedule e visit and left message.      If patient calls back:   Schedule Office Visit appointment, pt can also send E visit,  within 2 weeks with PCP, document that pt called and close encounter         Thuy Wild

## 2021-01-18 NOTE — TELEPHONE ENCOUNTER
TMJ is usually through a facial specialist. He would need a visit to get this from us but could be an evisit.    Sri May M.D.

## 2021-01-20 NOTE — TELEPHONE ENCOUNTER
This writer attempted to contact patient on 01/20/21      Reason for call needs visit and left voicemail message.      If patient calls back:   Schedule Office Visit appointment or start an evisit through your My Chart account within 1 week with PCP, document that pt called and close encounter         Bindu Pablo MA

## 2021-01-22 NOTE — TELEPHONE ENCOUNTER
No appointment made sent letter.  Bindu Pablo MA  [facility]  2nd Floor  Primary Care

## 2021-03-03 DIAGNOSIS — I10 HYPERTENSION GOAL BP (BLOOD PRESSURE) < 140/90: ICD-10-CM

## 2021-03-03 RX ORDER — LISINOPRIL 10 MG/1
10 TABLET ORAL DAILY
Qty: 90 TABLET | Refills: 0 | Status: SHIPPED | OUTPATIENT
Start: 2021-03-03 | End: 2021-05-28

## 2021-04-08 ENCOUNTER — E-VISIT (OUTPATIENT)
Dept: URGENT CARE | Facility: CLINIC | Age: 41
End: 2021-04-08
Payer: COMMERCIAL

## 2021-04-08 DIAGNOSIS — B96.89 ACUTE BACTERIAL SINUSITIS: Primary | ICD-10-CM

## 2021-04-08 DIAGNOSIS — J01.90 ACUTE BACTERIAL SINUSITIS: Primary | ICD-10-CM

## 2021-04-08 PROCEDURE — 99421 OL DIG E/M SVC 5-10 MIN: CPT | Performed by: NURSE PRACTITIONER

## 2021-04-09 NOTE — PATIENT INSTRUCTIONS
Dear Tony Ortega    After reviewing your responses, I've been able to diagnose you with?Acute Sinusiitis     Based on your responses and diagnosis, I have prescribed Augmentin to treat your symptoms. I have sent this to your pharmacy.?     It is also important to stay well hydrated, get lots of rest and take over-the-counter decongestants,?tylenol?or ibuprofen if you?are able to?take those medications per your primary care provider to help relieve discomfort.?     It is important that you take?all of?your prescribed medication even if your symptoms are improving after a few doses.? Taking?all of?your medicine helps prevent the symptoms from returning.?     If your symptoms worsen, you develop severe headache, vomiting, high fever (>102), or are not improving in 7 days, please contact your primary care provider for an appointment or visit any of our convenient Walk-in Care or Urgent Care Centers to be seen which can be found on our website?here.?     Thanks again for choosing?us?as your health care partner,?   ?  GENE Campuzano CNP?     Sinusitis (Antibiotic Treatment)    The sinuses are air-filled spaces within the bones of the face. They connect to the inside of the nose. Sinusitis is an inflammation of the tissue that lines the sinuses. Sinusitis can occur during a cold. It can also happen due to allergies to pollens and other particles in the air. Sinusitis can cause symptoms of sinus congestion and a feeling of fullness. A sinus infection causes fever, headache, and facial pain. There is often green or yellow fluid draining from the nose or into the back of the throat (post-nasal drip). You have been given antibiotics to treat this condition.   Home care    Take the full course of antibiotics as instructed. Don't stop taking them, even when you feel better.    Drink plenty of water, hot tea, and other liquids as directed by the healthcare provider. This may help thin nasal mucus. It also may help  your sinuses drain fluids.    Heat may help soothe painful areas of your face. Use a towel soaked in hot water. Or,  the shower and direct the warm spray onto your face. Using a vaporizer along with a menthol rub at night may also help soothe symptoms.     An expectorant with guaifenesin may help thin nasal mucus and help your sinuses drain fluids. Talk with your provider or pharmacists before taking an over-the-counter (OTC) medicine if you have any questions about it or its side effects..    You can use an OTC decongestant, unless a similar medicine was prescribed to you. Nasal sprays work the fastest. Use one that contains phenylephrine or oxymetazoline. First blow your nose gently. Then use the spray. Don't use these medicines more often than directed on the label. If you do, your symptoms may get worse. You may also take pills that contain pseudoephedrine. Don t use products that combine multiple medicines. This is because side effects may be increased. Read labels. You can also ask the pharmacist for help. (People with high blood pressure should not use decongestants. They can raise blood pressure.) Talk with your provider or pharmacist if you have any questions about the medicine..    OTC antihistamines may help if allergies contributed to your sinusitis. Talk with your provider or pharmacist if you have any questions about the medicine..    Don't use nasal rinses or irrigation during an acute sinus infection, unless your healthcare provider tells you to. Rinsing may spread the infection to other areas in your sinuses.    Use acetaminophen or ibuprofen to control pain, unless another pain medicine was prescribed to you. If you have chronic liver or kidney disease or ever had a stomach ulcer, talk with your healthcare provider before using these medicines. Never give aspirin to anyone under age 18 who is ill with a fever. It may cause severe liver damage.    Don't smoke. This can make symptoms  worse.    Follow-up care  Follow up with your healthcare provider, or as advised.   When to seek medical advice  Call your healthcare provider if any of these occur:     Facial pain or headache that gets worse    Stiff neck    Unusual drowsiness or confusion    Swelling of your forehead or eyelids    Symptoms don't go away in 10 days    Vision problems, such as blurred or double vision    Fever of 100.4 F (38 C) or higher, or as directed by your healthcare provider  Call 911  Call 911 if any of these occur:     Seizure    Trouble breathing    Feeling dizzy or faint    Fingernails, skin or lips look blue, purple , or gray  Prevention  Here are steps you can take to help prevent an infection:     Keep good hand washing habits.    Don t have close contact with people who have sore throats, colds, or other upper respiratory infections.    Don t smoke, and stay away from secondhand smoke.    Stay up to date with of your vaccines.  Bella last reviewed this educational content on 12/1/2019 2000-2021 [employer]. All rights reserved. This information is not intended as a substitute for professional medical care. Always follow your healthcare professional's instructions.

## 2021-04-17 ENCOUNTER — HEALTH MAINTENANCE LETTER (OUTPATIENT)
Age: 41
End: 2021-04-17

## 2021-05-21 NOTE — PATIENT INSTRUCTIONS
At [facility], we strive to deliver an exceptional experience to you, every time we see you.  If you receive a survey in the mail, please send us back your thoughts. We really do value your feedback.    Based on your medical history, these are the current health maintenance/preventive care services that you are due for (some may have been done at this visit.)  Health Maintenance Due   Topic Date Due     HIV SCREEN (SYSTEM ASSIGNED)  03/04/1999       Suggested websites for health information:  Www.Moodswiing.org : Up to date and easily searchable information on multiple topics.  Www.medlineplus.gov : medication info, interactive tutorials, watch real surgeries online  Www.familydoctor.org : good info from the Academy of Family Physicians  Www.cdc.gov : public health info, travel advisories, epidemics (H1N1)  Www.aap.org : children's health info, normal development, vaccinations  Www.health.[facility].[place].us : [place] dept of health, public health issues in [place], N1N1    Your care team:                            Family Medicine Internal Medicine   MD William Tsai MD Shantel Branch-Fleming, MD Katya Georgiev PA-C Megan Hill, APRN CNP    Adelfo Best MD Pediatrics   José Miguel Harrell, PAIvetteC  Nancy Martinez, CNP MD Lynn Echeverria APRN CNP   MD Betzaida Gongora MD Deborah Mielke, MD Kim Thein, APRN [facility]      Clinic hours: Monday - Thursday 7 am-7 pm; Fridays 7 am-5 pm.   Urgent care: Monday - Friday 11 am-9 pm; Saturday and Sunday 9 am-5 pm.  Pharmacy : Monday -Thursday 8 am-8 pm; Friday 8 am-6 pm; Saturday and Sunday 9 am-5 pm.     Clinic: [phone]   Pharmacy: [phone]      Ganglion Cyst: Hand    A ganglion cyst is a firm, fluid-filled lump that can suddenly appear on the front or back of the wrist or at the base of a finger. These cysts grow from normal tissue in the wrist and fingers, and range in size from a pea to a peach pit. Although ganglion cysts  This writer attempted to contact pt on 05/21/21      Reason for call schedule visit and left message.      If patient calls back:   Schedule Office Visit appointment within 1 month with primary care, document that pt called and close encounter or sent to PCP for colleen refill if needed prior to visit date        Rachell Villa     are common, they don t spread, and they don t become cancerous. They can occur after an injury, but many times it isn t known why they grow. Ganglion cysts can change in size, and may go away on their own.  What are the symptoms of a ganglion cyst?  A ganglion cyst is sometimes painful, especially when it first occurs. Constantly using your hand or wrist can make the cyst enlarge and hurt more. Some hand and wrist movements, such as grasping things, may also be difficult.  How does a ganglion cyst develop?  Your wrist and hand are made up of many small bones that meet at joints. Tendons attach muscles to the bones at the joints. The tendons allow the joints to bend and straighten. Both tendons and joints are lined with tissue called synovium. This tissue makes a thick fluid that keeps the joints and tendons moving easily. Sometimes the tissue balloons out from the joint or tendons and forms a cyst. As the cyst fills with fluid and grows, it appears as a lump you can feel.  Where do ganglion cysts occur?  A ganglion cyst can occur anywhere on the hand near a joint. Cysts most commonly appear on the back or palm side of the wrist, or on the palm at the base of a finger. Your doctor can usually diagnose a cyst by examining the lump. He or she may draw off a little fluid or order an X-ray to rule out other problems.  How is a ganglion cyst treated?  Your healthcare provider may just watch your ganglion cyst. Many shrink and become painless without treatment. Some disappear altogether. If the cyst is unsightly or painful, or makes it hard for you to use your hand, your healthcare provider can treat it or, if needed, remove it surgically.  Nonsurgical treatment  To shrink the cyst, your provider may remove (aspirate) the fluid with a needle. If the cyst hurts, your provider may also give you an injection of an anti-inflammatory, such as cortisone, to relieve the irritation. Your hand may then be wrapped to help keep the  cyst from recurring.  Surgery  If the cyst reappears after treatment, your healthcare provider may remove it surgically. A section of the tissue that lines the joint or tendon is removed along with the cyst. This helps prevent another cyst from forming, although recurrence of the cyst is still possible after surgery. Usually, only your hand or arm is numbed, and you can go home a few hours after surgery. Your hand may be in a splint for several days.  Date Last Reviewed: 9/1/2017 2000-2017 [employer]. [address] [place] [address]. All rights reserved. This information is not intended as a substitute for professional medical care. Always follow your healthcare professional's instructions.        Understanding Rotator Cuff Tendonitis    Tendons are tough tissues that connect muscles to bone. A group of 4 muscles and their tendons form a  cuff  around the head of the upper arm bone. This is called the rotator cuff. It connects the upper arm to the shoulder blade. It gives the shoulder joint stability and strength.  If tendons are injured or strained, they may become irritated and swollen (inflamed). This is called tendonitis. Rotator cuff tendonitis may cause shoulder pain and loss of function.  What causes rotator cuff tendonitis?  Tendonitis results when the rotator cuff tendons are injured or overworked. The most common cause of injury is repetitive overhead activities. These can be work-related activities such as reaching, pushing, or lifting. Or they can be sports-related activities such as throwing, swimming, or lifting weights.  Symptoms of rotator cuff tendonitis  Pain on the side of the upper arm is the most common symptom. Pain may get worse with overhead movements or when you raise the arm above shoulder level. It may also hurt to lie on the shoulder at night.  Treatment for rotator cuff tendonitis  Treatment may include the following:    Active rest. This lets the rotator cuff  heal. Active rest means using your arm and shoulder, but avoiding activities that cause pain, such as reaching overhead or sleeping on the shoulder.    Cold packs. Putting ice packs on the shoulder helps reduce swelling and relieve pain.    Pain medicines. Prescription or over-the-counter pain medicines can help relieve pain and swelling.    Arm and shoulder exercises. These help keep the shoulder joint mobile as it heals. They also help improve the strength of muscles around the joint.  Possible complications  It might be tempting to stop using your shoulder completely to avoid pain. But doing so may lead to a condition called  frozen shoulder.  To help prevent this, following instructions you are given for active rest and for doing exercises to help your shoulder heal.  When to call your healthcare provider  Call your healthcare provider right away if you have any of these:    Fever of 100.4 F (38 C) or higher, or as directed    Symptoms that don t get better, or get worse    New symptoms   Date Last Reviewed: 3/10/2016    6150-1314 [employer]. [address] [place] [address]. All rights reserved. This information is not intended as a substitute for professional medical care. Always follow your healthcare professional's instructions.        Understanding Cervical Radiculopathy    Cervical radiculopathy is irritation or inflammation of a nerve root in the neck. It causes neck pain and other symptoms that may spread into the chest or down the arm. To understand this condition, it helps to understand the parts of the spine:    Vertebrae. These are bones that stack to form the spine. The cervical spine contains the 7 vertebrae in the neck.    Disks. These are soft pads of tissue between the vertebrae. They act as shock absorbers for the spine.    The spinal canal. This is a tunnel formed within the stacked vertebrae. The spinal cord runs through this canal.    Nerves. These branch off the  spinal cord. As they leave the spinal canal, nerves pass through openings between the vertebrae. The nerve root is the part of the nerve that is closest to the spinal cord.   With cervical radiculopathy, nerve roots in the neck become irritated. This leads to pain and symptoms that can travel to the nerves that go from the spinal cord down the arms and into the torso.  What causes cervical radiculopathy?  Aging, injury, poor posture, and other issues can lead to problems in the neck. These problems may then irritate nerve roots. These include:    Damage to a disk in the cervical spine. The damaged disk may then press on nearby nerve roots.    Degeneration from wear and tear, and aging. This can lead to narrowing (stenosis) of the openings between the vertebrae. The narrowed openings press on nerve roots as they leave the spinal canal.    An unstable spine. This is when a vertebra slips forward. It can then press on a nerve root.  There are other, less common causes of pressure on nerves in the neck. These include infection, cysts, and tumors.  Symptoms of cervical radiculopathy  These include:    Neck pain    Pain, numbness, tingling, or weakness that travels down the arm    Loss of neck movement    Muscle spasms  Treatment for cervical radiculopathy  In most cases, your healthcare provider will first try treatments that help relieve symptoms. These may include:    Prescription or over-the-counter pain medicines. These help relieve pain and swelling.    Cold packs. These help reduce pain.    Resting. This involves avoiding positions and activities that increase pain.    Neck brace (cervical collar). This can help relieve inflammation and pain.    Physical therapy, including exercises and stretches. This can help decrease pain and increase movement and function.    Shots of medicinesaround the nerve roots. This is done to help relieve symptoms for a time.  In some cases, your healthcare provider may advise surgery to  fix the underlying problem. This depends on the cause, the symptoms, and how long the pain has lasted.  Possible complications  Over time, an irritated and inflamed nerve may become damaged. This may lead to long-lasting (permanent) numbness or weakness. If symptoms change suddenly or get worse, be sure to let your healthcare provider know.     When to call your healthcare provider  Call your healthcare provider right away if you have any of these:    New pain or pain that gets worse    New or increasing weakness, numbness, or tingling in your arm or hand    Bowel or bladder changes   Date Last Reviewed: 3/10/2016    2043-6967 "[employer]. [address] [place] [address]. All rights reserved. This information is not intended as a substitute for professional medical care. Always follow your healthcare professional's instructions.      Please call the [facility]  [phone] to schedule your left shoulder and left scapula MRI.

## 2021-08-23 ENCOUNTER — IMMUNIZATION (OUTPATIENT)
Dept: NURSING | Facility: CLINIC | Age: 41
End: 2021-08-23
Payer: COMMERCIAL

## 2021-08-23 PROCEDURE — 91300 PR COVID VAC PFIZER DIL RECON 30 MCG/0.3 ML IM: CPT

## 2021-08-23 PROCEDURE — 0001A PR COVID VAC PFIZER DIL RECON 30 MCG/0.3 ML IM: CPT

## 2021-08-30 DIAGNOSIS — I10 HYPERTENSION GOAL BP (BLOOD PRESSURE) < 140/90: ICD-10-CM

## 2021-08-30 NOTE — TELEPHONE ENCOUNTER
Routing refill request to provider for review/approval because:  Chyna given x1 and patient did not follow up, please advise  Marta Ballesteros BSN, RN

## 2021-09-01 RX ORDER — LISINOPRIL 10 MG/1
10 TABLET ORAL DAILY
Qty: 30 TABLET | Refills: 0 | Status: SHIPPED | OUTPATIENT
Start: 2021-09-01 | End: 2021-10-01

## 2021-09-13 ENCOUNTER — IMMUNIZATION (OUTPATIENT)
Dept: NURSING | Facility: CLINIC | Age: 41
End: 2021-09-13
Attending: FAMILY MEDICINE
Payer: COMMERCIAL

## 2021-09-13 PROCEDURE — 0002A PR COVID VAC PFIZER DIL RECON 30 MCG/0.3 ML IM: CPT

## 2021-09-13 PROCEDURE — 91300 PR COVID VAC PFIZER DIL RECON 30 MCG/0.3 ML IM: CPT

## 2021-09-26 ENCOUNTER — HEALTH MAINTENANCE LETTER (OUTPATIENT)
Age: 41
End: 2021-09-26

## 2021-10-01 DIAGNOSIS — I10 HYPERTENSION GOAL BP (BLOOD PRESSURE) < 140/90: ICD-10-CM

## 2021-10-01 RX ORDER — LISINOPRIL 10 MG/1
10 TABLET ORAL DAILY
Qty: 10 TABLET | Refills: 0 | Status: SHIPPED | OUTPATIENT
Start: 2021-10-01 | End: 2021-10-11

## 2021-10-11 ENCOUNTER — VIRTUAL VISIT (OUTPATIENT)
Dept: FAMILY MEDICINE | Facility: CLINIC | Age: 41
End: 2021-10-11
Payer: COMMERCIAL

## 2021-10-11 ENCOUNTER — ANCILLARY PROCEDURE (OUTPATIENT)
Dept: GENERAL RADIOLOGY | Facility: CLINIC | Age: 41
End: 2021-10-11
Payer: COMMERCIAL

## 2021-10-11 DIAGNOSIS — R68.84 JAW PAIN: Primary | ICD-10-CM

## 2021-10-11 DIAGNOSIS — M79.641 HAND PAIN, RIGHT: ICD-10-CM

## 2021-10-11 DIAGNOSIS — R68.84 JAW PAIN: ICD-10-CM

## 2021-10-11 DIAGNOSIS — I10 HYPERTENSION GOAL BP (BLOOD PRESSURE) < 140/90: ICD-10-CM

## 2021-10-11 PROCEDURE — 70328 X-RAY EXAM OF JAW JOINT: CPT | Mod: LT | Performed by: RADIOLOGY

## 2021-10-11 PROCEDURE — 99214 OFFICE O/P EST MOD 30 MIN: CPT | Mod: GT | Performed by: FAMILY MEDICINE

## 2021-10-11 RX ORDER — LISINOPRIL 10 MG/1
10 TABLET ORAL DAILY
Qty: 90 TABLET | Refills: 3 | Status: SHIPPED | OUTPATIENT
Start: 2021-10-11 | End: 2022-10-14

## 2021-10-11 NOTE — RESULT ENCOUNTER NOTE
Mr. Ortega,    Your xray was read a normal so MRI was ordered.  You should get a call to schedule this or can call them at [phone]    Please contact the clinic if you have additional questions.  Thank you.    Sincerely,    Sri Singer MD

## 2021-10-11 NOTE — PROGRESS NOTES
Tony is a 41 year old who is being evaluated via a billable video visit.      How would you like to obtain your AVS? MyChart  If the video visit is dropped, the invitation should be resent by:   Will anyone else be joining your video visit? No      Video Start Time: 9:07 AM    Assessment & Plan     Jaw pain  Xray with CT/MRI if needed and referral to pain specialist  - Pain Management Referral; Future  - XR TMJ Open/Closed Left; Future    Hypertension goal BP (blood pressure) < 140/90  Controlled - continue current treatment and check labs.  - lisinopril (ZESTRIL) 10 MG tablet; Take 1 tablet (10 mg) by mouth daily  - Basic metabolic panel  (Ca, Cl, CO2, Creat, Gluc, K, Na, BUN); Future  - Lipid panel reflex to direct LDL Non-fasting; Future    Hand pain, right  Recommended ice and monitor for gradual improvement over the next 4 weeks      Return in about 4 weeks (around 11/8/2021).    Sri Singer MD  [facility]   Tony is a 41 year old who presents for the following health issues     HPI     Left ear pain related to left jaw pain/clickiing for about 2 years.  Dentist suggest TMJ. He has had his ear fully investigated by ENT.  Will sometimes yawn and have severe jaw pain.    HTN - taking as directed and no side effects. Checking BPs at home.    Seen in ED x two due to right hand pain. First due to trauma and then sudden pain when lifting soon.  Seen and xrays negative.  Has dull pain now that is worse with flexing hand towards thumb.    Review of Systems   Constitutional, HEENT, cardiovascular, pulmonary, gi and gu systems are negative, except as otherwise noted.      Objective    Vitals - Patient Reported  Systolic (Patient Reported): 127  Diastolic (Patient Reported): 80      Vitals:  No vitals were obtained today due to virtual visit.    Physical Exam   GENERAL: Healthy, alert and no distress, obese  EYES: Eyes grossly normal to inspection.  No  discharge or erythema, or obvious scleral/conjunctival abnormalities.  RESP: No audible wheeze, cough, or visible cyanosis.  No visible retractions or increased work of breathing.    SKIN: Visible skin clear. No significant rash, abnormal pigmentation or lesions.  NEURO: Cranial nerves grossly intact.  Mentation and speech appropriate for age.  PSYCH: Mentation appears normal, affect normal/bright, judgement and insight intact, normal speech and appearance well-groomed.          Video-Visit Details    Type of service:  Video Visit    Video End Time:9:24 AM    Originating Location (pt. Location): Home    Distant Location (provider location):  [facility]     Platform used for Video Visit: [employer]

## 2021-11-01 ENCOUNTER — ANCILLARY PROCEDURE (OUTPATIENT)
Dept: MRI IMAGING | Facility: CLINIC | Age: 41
End: 2021-11-01
Attending: FAMILY MEDICINE
Payer: COMMERCIAL

## 2021-11-01 DIAGNOSIS — R68.84 JAW PAIN: ICD-10-CM

## 2021-11-01 PROCEDURE — 70336 MAGNETIC IMAGE JAW JOINT: CPT | Mod: GC | Performed by: RADIOLOGY

## 2021-11-02 NOTE — RESULT ENCOUNTER NOTE
MrAlly Ortega,    Your MRI showed a questionable fluid build up in the left TMJ. You might want to go back to see ENT about that.    Please contact the clinic if you have additional questions.  Thank you.    Sincerely,    Sri Singer MD

## 2021-11-16 NOTE — PROGRESS NOTES
"History of Present Illness - Tony Ortega is a very pleasant 41 year old male last seen on 4/27/2020.    To review, this problem started in December 2019 where he started to get issues with sudden changes of balance and feelings of nausea.  \"It felt like things were tilting,\" but no spinning sensation at all.  There was no prodromal or concurrent URI, ad no changes in medications or diet.  His head also felt foggy and off as well.  He reported that it slowly got better, but then it came back in February.  Since then the LEFT ear has continued to feel stuffy.  There has been no pain or drainage.  The hearing feels somewhat muffled in the LEFT as well.  No headache or changes in vision.    No history of ear disease, no ear surgery.  No family history of vertigo.    So after the initial visit on 3/9/20 I felt strongly that this was LEFT Meniere's disease and started him on a trial dose of a half tablet of Maxzide daily.  On ourr virtual visit on 4/13/20, there was still considerable fullness in the LEFT ear and the balance had notably improved.  Therefore I increased the dose to a full Dyazide capsule daily.      He contacted me again with complaints of sore throat and post nasal drainage despite use of flonase, and was still feeling significantly better from a dizziness standpoint, so I asked him to follow up in person for a repeat audiogram and physical exam.    At the 4/27/20 visit he reported that the LEFT ear popped and the sense of pressure has been better.  The off balance feeling is still much better.  And finally, the mild burning irritated sensation in the throat.  As soon as he stopped the flonase, that throat symptom went away.  He also stopped the dyazide already because of upset stomach.    At the 4/27/2020 visit, his audiogram also remained normal.  He was lost to follow up until now.    He is happy to tell me that he has had no dizziness at all, which he is very happy about.  He notes that for about " one year he has had LEFT ear clicking.  He gets severe pain with yawning, and saw his dentist who suggested temporomandibular disease.      Past Medical History -   Patient Active Problem List   Diagnosis     Lipid screening     Obesity, Class I, BMI 30-34.9     Screening for diabetes mellitus     Cervicalgia     Neck pain on left side     Left shoulder pain     Chronic periscapular pain on left side     Dizziness     Hypertension goal BP (blood pressure) < 140/90     Morbid obesity (H)       Current Medications -   Current Outpatient Medications:      lisinopril (ZESTRIL) 10 MG tablet, Take 1 tablet (10 mg) by mouth daily, Disp: 90 tablet, Rfl: 3    Allergies - No Known Allergies    Social History -   Social History     Socioeconomic History     Marital status:      Spouse name: Not on file     Number of children: Not on file     Years of education: Not on file     Highest education level: Not on file   Occupational History     Not on file   Tobacco Use     Smoking status: Former Smoker     Packs/day: 1.50     Years: 10.00     Pack years: 15.00     Types: Cigarettes     Start date: 2000     Quit date: 3/9/2012     Years since quittin.6     Smokeless tobacco: Never Used   Substance and Sexual Activity     Alcohol use: Yes     Comment: Occasionally     Drug use: No     Sexual activity: Yes     Partners: Female     Birth control/protection: None   Other Topics Concern     Parent/sibling w/ CABG, MI or angioplasty before 65F 55M? No   Social History Narrative     Not on file     Social Determinants of Health     Financial Resource Strain: Not on file   Food Insecurity: Not on file   Transportation Needs: Not on file   Physical Activity: Not on file   Stress: Not on file   Social Connections: Not on file   Intimate Partner Violence: Not on file   Housing Stability: Not on file       Family History -   Family History   Problem Relation Age of Onset     Diabetes Paternal Grandfather      Hypertension  Paternal Grandfather      Diabetes Maternal Grandfather      Coronary Artery Disease Maternal Grandfather      Hypertension Maternal Grandfather      Colon Cancer Maternal Grandfather      Thyroid Disease Father         Doctor thinks it related to a traumatic headinjury     Hyperlipidemia No family hx of      Cerebrovascular Disease No family hx of      Prostate Cancer No family hx of      Depression No family hx of      Anxiety Disorder No family hx of      Thyroid Disease No family hx of      Asthma No family hx of        Review of Systems - As per HPI and PMHx, otherwise 10+ system review of the head and neck, and general constitution is negative.    Physical Exam  /87 (BP Location: Right arm, Patient Position: Sitting, Cuff Size: Adult Large)   Pulse 79   Wt 117 kg (258 lb)   SpO2 97%   BMI 34.99 kg/m      General - The patient is well nourished and well developed, and appears to have good nutritional status.  Alert and oriented to person and place, answers questions and cooperates with examination appropriately.   Head and Face - Normocephalic and atraumatic, with no gross asymmetry noted of the contour of the facial features.  The facial nerve is intact, with strong symmetric movements.  Voice and Breathing - The patient was breathing comfortably without the use of accessory muscles. There was no wheezing, stridor, or stertor.  The patients voice was clear and strong, and had appropriate pitch and quality.  Ears - The tympanic membranes are normal in appearance, bony landmarks are intact.  No retraction, perforation, or masses.  No fluid or purulence was seen in the external canal or the middle ear. No evidence of infection of the middle ear or external canal, cerumen was normal in appearance.  Eyes - Extraocular movements intact, and the pupils were reactive to light.  Sclera were not icteric or injected, conjunctiva were pink and moist.  Mouth - Examination of the oral cavity showed pink, healthy  oral mucosa. No lesions or ulcerations noted.  The tongue was mobile and midline, and the dentition were in good condition.    Throat - The walls of the oropharynx were smooth, pink, moist, symmetric, and had no lesions or ulcerations.  The tonsillar pillars and soft palate were symmetric.  The uvula was midline on elevation.    Neck - Normal midline excursion of the laryngotracheal complex during swallowing.  Full range of motion on passive movement.  Palpation of the occipital, submental, submandibular, internal jugular chain, and supraclavicular nodes did not demonstrate any abnormal lymph nodes or masses.  The carotid pulse was palpable bilaterally.  Palpation of the thyroid was soft and smooth, with no nodules or goiter appreciated.  The trachea was mobile and midline.  Nose - External contour is symmetric, no gross deflection or scars.  Nasal mucosa is pink and moist with no abnormal mucus.  The septum was midline and non-obstructive, turbinates of normal size and position.  No polyps, masses, or purulence noted on examination.    Audiologic Studies - An audiogram and tympanogram were performed today as part of the evaluation and personally reviewed. It was also compared to the last test on 3/9/2020.  The tympanogram shows a normal Type A curve, with normal canal volume and middle ear pressure.  There is no sign of eustachian tube dysfunction or middle ear effusion.  The audiogram was normal other than a symmetric dip at 3000Hz.  No evidence of conductive hearing loss or significant asymmetry.      A/P - Tony Ortega is a 41 year old male  (H93.8X2) Ear fullness, left  (primary encounter diagnosis)  (H81.02) Meniere's disease, left  (R42) Dizziness    Based on today's history and physical exam, I can find no evidence of middle ear pathology or eustachian tube dysfunction.  At this point my primary diagnosis is of temporomandibular syndrome.  I have discussed the etiology of TMJ, and the reasons why  referred pain can mimic symptoms of ear disease, headaches, and even sinusitis.  i have given the patient an instructional sheet of things to be tried at home, as well a referral to a TMJ specialist should it be needed.  Finally, I counseled the patient that should the therapy not help, or should the symptoms change, that they should return to me.    I have prescribed Flexeril and referred to [facility]

## 2021-11-19 ENCOUNTER — OFFICE VISIT (OUTPATIENT)
Dept: OTOLARYNGOLOGY | Facility: CLINIC | Age: 41
End: 2021-11-19
Payer: COMMERCIAL

## 2021-11-19 ENCOUNTER — OFFICE VISIT (OUTPATIENT)
Dept: AUDIOLOGY | Facility: CLINIC | Age: 41
End: 2021-11-19
Payer: COMMERCIAL

## 2021-11-19 VITALS
SYSTOLIC BLOOD PRESSURE: 136 MMHG | DIASTOLIC BLOOD PRESSURE: 87 MMHG | BODY MASS INDEX: 34.99 KG/M2 | OXYGEN SATURATION: 97 % | HEART RATE: 79 BPM | WEIGHT: 258 LBS

## 2021-11-19 DIAGNOSIS — H90.41 SENSORINEURAL HEARING LOSS (SNHL) OF RIGHT EAR WITH UNRESTRICTED HEARING OF LEFT EAR: Primary | ICD-10-CM

## 2021-11-19 DIAGNOSIS — R51.9 LEFT FACIAL PRESSURE AND PAIN: ICD-10-CM

## 2021-11-19 DIAGNOSIS — H81.02 MENIERE'S DISEASE, LEFT: ICD-10-CM

## 2021-11-19 DIAGNOSIS — H93.8X2 EAR FULLNESS, LEFT: Primary | ICD-10-CM

## 2021-11-19 DIAGNOSIS — R42 DIZZINESS: ICD-10-CM

## 2021-11-19 PROCEDURE — 99207 PR NO CHARGE LOS: CPT | Performed by: AUDIOLOGIST

## 2021-11-19 PROCEDURE — 92550 TYMPANOMETRY & REFLEX THRESH: CPT | Performed by: AUDIOLOGIST

## 2021-11-19 PROCEDURE — 92557 COMPREHENSIVE HEARING TEST: CPT | Performed by: AUDIOLOGIST

## 2021-11-19 PROCEDURE — 99214 OFFICE O/P EST MOD 30 MIN: CPT | Performed by: OTOLARYNGOLOGY

## 2021-11-19 RX ORDER — CYCLOBENZAPRINE HCL 10 MG
10 TABLET ORAL 3 TIMES DAILY PRN
Qty: 50 TABLET | Refills: 1 | Status: SHIPPED | OUTPATIENT
Start: 2021-11-19 | End: 2022-04-13

## 2021-11-19 NOTE — PROGRESS NOTES
AUDIOLOGY REPORT:    Patient was referred to [facility]iology from ENT by Dr. Yeh for a hearing examination. Patient reports a left sided jaw 'click' that bring on significant jaw and facial pain. Patient was unaccompanied to today's visit.     Testing:    Otoscopy:   Otoscopic exam indicates ears are clear of cerumen bilaterally     Tympanograms:    RIGHT: normal eardrum mobility     LEFT:   normal eardrum mobility    Reflexes (reported by stimulus ear): 1000 Hz  RIGHT: Ipsilateral is present at normal levels  RIGHT: Contralateral is present at normal levels  LEFT:   Ipsilateral is present at normal levels  LEFT:   Contralateral is present at normal levels    Thresholds:   Pure Tone Thresholds assessed using conventional audiometry with good  reliability from 250-8000 Hz bilaterally using insert earphones     RIGHT:  normal and borderline-normal hearing sensitivity for most all frequencies tested with a mild sensorineural hearing loss at 4000 Hz only    LEFT:    normal and borderline-normal hearing sensitivity for all frequencies tested.     Speech Reception Threshold:    RIGHT: 15 dB HL    LEFT:   10 dB HL    Word Recognition Score:     RIGHT: 96% at 55 dB HL using NU-6 recorded word list.    LEFT:   100% at 50 dB HL using NU-6 recorded word list.    Discussed results with the patient.     Patient was returned to ENT for follow up.     Joey León CCC-A  Licensed Audiologist  11/19/2021

## 2021-11-19 NOTE — NURSING NOTE
Chief Complaint   Patient presents with     Hearing Problem       Vitals:    11/19/21 1109   BP: 136/87   BP Location: Right arm   Patient Position: Sitting   Cuff Size: Adult Large   Pulse: 79   SpO2: 97%   Weight: 117 kg (258 lb)     Wt Readings from Last 1 Encounters:   11/19/21 117 kg (258 lb)     Ht Readings from Last 1 Encounters:   12/11/19 1.829 m (6')       Alli Martin [facility], 11/19/2021 11:09 AM

## 2021-11-19 NOTE — LETTER
"    11/19/2021         RE: Tony Ortega  [address]  [place] [address]        Dear Colleague,    Thank you for referring your patient, Tony Ortega, to the [facility]. Please see a copy of my visit note below.    History of Present Illness - Tony Ortega is a very pleasant 41 year old male last seen on 4/27/2020.    To review, this problem started in December 2019 where he started to get issues with sudden changes of balance and feelings of nausea.  \"It felt like things were tilting,\" but no spinning sensation at all.  There was no prodromal or concurrent URI, ad no changes in medications or diet.  His head also felt foggy and off as well.  He reported that it slowly got better, but then it came back in February.  Since then the LEFT ear has continued to feel stuffy.  There has been no pain or drainage.  The hearing feels somewhat muffled in the LEFT as well.  No headache or changes in vision.    No history of ear disease, no ear surgery.  No family history of vertigo.    So after the initial visit on 3/9/20 I felt strongly that this was LEFT Meniere's disease and started him on a trial dose of a half tablet of Maxzide daily.  On ourr virtual visit on 4/13/20, there was still considerable fullness in the LEFT ear and the balance had notably improved.  Therefore I increased the dose to a full Dyazide capsule daily.      He contacted me again with complaints of sore throat and post nasal drainage despite use of flonase, and was still feeling significantly better from a dizziness standpoint, so I asked him to follow up in person for a repeat audiogram and physical exam.    At the 4/27/20 visit he reported that the LEFT ear popped and the sense of pressure has been better.  The off balance feeling is still much better.  And finally, the mild burning irritated sensation in the throat.  As soon as he stopped the flonase, that throat symptom went away.  He also stopped the " dyazide already because of upset stomach.    At the 2020 visit, his audiogram also remained normal.  He was lost to follow up until now.    He is happy to tell me that he has had no dizziness at all, which he is very happy about.  He notes that for about one year he has had LEFT ear clicking.  He gets severe pain with yawning, and saw his dentist who suggested temporomandibular disease.      Past Medical History -   Patient Active Problem List   Diagnosis     Lipid screening     Obesity, Class I, BMI 30-34.9     Screening for diabetes mellitus     Cervicalgia     Neck pain on left side     Left shoulder pain     Chronic periscapular pain on left side     Dizziness     Hypertension goal BP (blood pressure) < 140/90     Morbid obesity (H)       Current Medications -   Current Outpatient Medications:      lisinopril (ZESTRIL) 10 MG tablet, Take 1 tablet (10 mg) by mouth daily, Disp: 90 tablet, Rfl: 3    Allergies - No Known Allergies    Social History -   Social History     Socioeconomic History     Marital status:      Spouse name: Not on file     Number of children: Not on file     Years of education: Not on file     Highest education level: Not on file   Occupational History     Not on file   Tobacco Use     Smoking status: Former Smoker     Packs/day: 1.50     Years: 10.00     Pack years: 15.00     Types: Cigarettes     Start date: 2000     Quit date: 3/9/2012     Years since quittin.6     Smokeless tobacco: Never Used   Substance and Sexual Activity     Alcohol use: Yes     Comment: Occasionally     Drug use: No     Sexual activity: Yes     Partners: Female     Birth control/protection: None   Other Topics Concern     Parent/sibling w/ CABG, MI or angioplasty before 65F 55M? No   Social History Narrative     Not on file     Social Determinants of Health     Financial Resource Strain: Not on file   Food Insecurity: Not on file   Transportation Needs: Not on file   Physical Activity: Not on file    Stress: Not on file   Social Connections: Not on file   Intimate Partner Violence: Not on file   Housing Stability: Not on file       Family History -   Family History   Problem Relation Age of Onset     Diabetes Paternal Grandfather      Hypertension Paternal Grandfather      Diabetes Maternal Grandfather      Coronary Artery Disease Maternal Grandfather      Hypertension Maternal Grandfather      Colon Cancer Maternal Grandfather      Thyroid Disease Father         Doctor thinks it related to a traumatic headinjury     Hyperlipidemia No family hx of      Cerebrovascular Disease No family hx of      Prostate Cancer No family hx of      Depression No family hx of      Anxiety Disorder No family hx of      Thyroid Disease No family hx of      Asthma No family hx of        Review of Systems - As per HPI and PMHx, otherwise 10+ system review of the head and neck, and general constitution is negative.    Physical Exam  /87 (BP Location: Right arm, Patient Position: Sitting, Cuff Size: Adult Large)   Pulse 79   Wt 117 kg (258 lb)   SpO2 97%   BMI 34.99 kg/m      General - The patient is well nourished and well developed, and appears to have good nutritional status.  Alert and oriented to person and place, answers questions and cooperates with examination appropriately.   Head and Face - Normocephalic and atraumatic, with no gross asymmetry noted of the contour of the facial features.  The facial nerve is intact, with strong symmetric movements.  Voice and Breathing - The patient was breathing comfortably without the use of accessory muscles. There was no wheezing, stridor, or stertor.  The patients voice was clear and strong, and had appropriate pitch and quality.  Ears - The tympanic membranes are normal in appearance, bony landmarks are intact.  No retraction, perforation, or masses.  No fluid or purulence was seen in the external canal or the middle ear. No evidence of infection of the middle ear or  external canal, cerumen was normal in appearance.  Eyes - Extraocular movements intact, and the pupils were reactive to light.  Sclera were not icteric or injected, conjunctiva were pink and moist.  Mouth - Examination of the oral cavity showed pink, healthy oral mucosa. No lesions or ulcerations noted.  The tongue was mobile and midline, and the dentition were in good condition.    Throat - The walls of the oropharynx were smooth, pink, moist, symmetric, and had no lesions or ulcerations.  The tonsillar pillars and soft palate were symmetric.  The uvula was midline on elevation.    Neck - Normal midline excursion of the laryngotracheal complex during swallowing.  Full range of motion on passive movement.  Palpation of the occipital, submental, submandibular, internal jugular chain, and supraclavicular nodes did not demonstrate any abnormal lymph nodes or masses.  The carotid pulse was palpable bilaterally.  Palpation of the thyroid was soft and smooth, with no nodules or goiter appreciated.  The trachea was mobile and midline.  Nose - External contour is symmetric, no gross deflection or scars.  Nasal mucosa is pink and moist with no abnormal mucus.  The septum was midline and non-obstructive, turbinates of normal size and position.  No polyps, masses, or purulence noted on examination.    Audiologic Studies - An audiogram and tympanogram were performed today as part of the evaluation and personally reviewed. It was also compared to the last test on 3/9/2020.  The tympanogram shows a normal Type A curve, with normal canal volume and middle ear pressure.  There is no sign of eustachian tube dysfunction or middle ear effusion.  The audiogram was normal other than a symmetric dip at 3000Hz.  No evidence of conductive hearing loss or significant asymmetry.      A/P - Tony Ortega is a 41 year old male  (H93.8X2) Ear fullness, left  (primary encounter diagnosis)  (H81.02) Meniere's disease, left  (R42)  Dizziness    Based on today's history and physical exam, I can find no evidence of middle ear pathology or eustachian tube dysfunction.  At this point my primary diagnosis is of temporomandibular syndrome.  I have discussed the etiology of TMJ, and the reasons why referred pain can mimic symptoms of ear disease, headaches, and even sinusitis.  i have given the patient an instructional sheet of things to be tried at home, as well a referral to a TMJ specialist should it be needed.  Finally, I counseled the patient that should the therapy not help, or should the symptoms change, that they should return to me.    I have prescribed Flexeril and referred to [facility]        Again, thank you for allowing me to participate in the care of your patient.        Sincerely,        Marcus Yeh MD

## 2021-11-29 ENCOUNTER — THERAPY VISIT (OUTPATIENT)
Dept: PHYSICAL THERAPY | Facility: CLINIC | Age: 41
End: 2021-11-29
Attending: OTOLARYNGOLOGY
Payer: COMMERCIAL

## 2021-11-29 DIAGNOSIS — H93.8X2 EAR FULLNESS, LEFT: ICD-10-CM

## 2021-11-29 DIAGNOSIS — M26.609 TMJ (TEMPOROMANDIBULAR JOINT SYNDROME): ICD-10-CM

## 2021-11-29 DIAGNOSIS — R51.9 LEFT FACIAL PRESSURE AND PAIN: ICD-10-CM

## 2021-11-29 PROCEDURE — 97161 PT EVAL LOW COMPLEX 20 MIN: CPT | Mod: GP | Performed by: PHYSICAL THERAPIST

## 2021-11-29 PROCEDURE — 97110 THERAPEUTIC EXERCISES: CPT | Mod: GP | Performed by: PHYSICAL THERAPIST

## 2021-11-29 NOTE — PROGRESS NOTES
Physical Therapy Initial Evaluation  Subjective:  The history is provided by the patient. No  was used.   Therapist Generated HPI Evaluation  Problem details: Pt reports onset of L ear fullness about two years ago. Was originally misdiagnosed with Meneire's disease. Was getting some ear injections and drainage. Visited primary care MD in October 2021, MRI was ordered which was negative. Referred to ENT and dx with TMD. Muscle relaxers rx has helped his current symptoms. .         Type of problem:  TMJ left.    This is a chronic condition.  Condition occurred with:  Other reason.  Where condition occurred: for unknown reasons.  Patient reports pain:  TMJ left.  Pain is described as aching and sharp and is intermittent.  Pain radiates to:  Ear. Pain is the same all the time.  Since onset symptoms are rapidly improving.  Associated symptoms:  Ear ache, joint noise, bite feels off, stress, cheek pain, ear plugging, muscle tightness, tinnitus and tired or painful jaw muscles. Symptoms are exacerbated by yawning (sleeping on stomach)  and relieved by muscle relaxants and heat (general exercise).  Special tests included:  MRI (neg).    Restrictions due to condition include:  Working in normal job without restrictions.  Barriers include:  None as reported by patient.  Parafunctional Habits:    Bruxism:  No  Mandibular Habits:  Chew mints    Clenching:  Yes    Caffeine:  Yes--10 sodas/day    Aerobic Exercise:  Stationary bike 2-3x/wk  Sleep Quality:  Poor  Patient Health History         Pain is reported as 0/10 on pain scale.  General health as reported by patient is fair.  Pertinent medical history includes: high blood pressure, hepatitis, overweight and smoking (former smoker).   Red flags:  None as reported by patient.  Medical allergies: none.   Surgeries include:  None.    Current medications:  Muscle relaxants and high blood pressure medication.    Current occupation is [employer].    Primary job tasks include:  Computer work, lifting/carrying and prolonged sitting.                                    Objective:  Standing Alignment:    Cervical/Thoracic:  Forward head  Shoulder/UE:  Rounded shoulders                                                          TMJ Evaluation  ROM:       AROM TMJ:  Openin mm     Left Laterotrusion:  10 mm    Right Laterotrusion:  12 mm                Palpation:  normal        Movement Characteristics:  normal            TMJ Findings:    Tongue Positioning:  Needs cues, tends to point tongue on palate                          Terrell Cervical Evaluation    Posture:  Sitting: poor            Movement Loss:    Flexion (Flex): nil  Retraction (RET): nil  Extension (EXT): nil  Lateral Flexion Right (LF R): nil  Lateral Flexion Left (LF L): nil  Rotation Right (ROT R): nil  Rotation Left (ROT L): nil, min and pain                                                 ROS    Assessment/Plan:    Patient is a 41 year old male with left side TMJ complaints.    Patient has the following significant findings with corresponding treatment plan.                Diagnosis 1:  L TMD  Pain -  manual therapy, self management, education, directional preference exercise and home program  Inflammation - self management/home program  Impaired muscle performance - neuro re-education and home program  Decreased function - therapeutic activities and home program  Impaired posture - neuro re-education, therapeutic activities and home program    1) Decision-Making    Low complexity using standardized patient assessment instrument and/or measureable assessment of functional outcome.  Cumulative Therapy Evaluation is: Low complexity.    Previous and current functional limitations:  (See Goal Flow Sheet for this information)    Short term and Long term goals: (See Goal Flow Sheet for this information)     Communication ability:  Patient appears to be able to clearly communicate and understand  verbal and written communication and follow directions correctly.  Treatment Explanation - The following has been discussed with the patient:   RX ordered/plan of care  Anticipated outcomes  Possible risks and side effects  This patient would benefit from PT intervention to resume normal activities.   Rehab potential is excellent.    Frequency:  1 X week, once daily  Duration:  for 6 weeks  Discharge Plan:  Achieve all LTG.  Independent in home treatment program.  Reach maximal therapeutic benefit.    Please refer to the daily flowsheet for treatment today, total treatment time and time spent performing 1:1 timed codes.

## 2021-12-06 ENCOUNTER — THERAPY VISIT (OUTPATIENT)
Dept: PHYSICAL THERAPY | Facility: CLINIC | Age: 41
End: 2021-12-06
Attending: OTOLARYNGOLOGY
Payer: COMMERCIAL

## 2021-12-06 DIAGNOSIS — M26.609 TMJ (TEMPOROMANDIBULAR JOINT SYNDROME): ICD-10-CM

## 2021-12-06 PROCEDURE — 97110 THERAPEUTIC EXERCISES: CPT | Mod: GP | Performed by: PHYSICAL THERAPIST

## 2021-12-06 PROCEDURE — 97140 MANUAL THERAPY 1/> REGIONS: CPT | Mod: GP | Performed by: PHYSICAL THERAPIST

## 2021-12-06 PROCEDURE — 97112 NEUROMUSCULAR REEDUCATION: CPT | Mod: GP | Performed by: PHYSICAL THERAPIST

## 2021-12-06 NOTE — PROGRESS NOTES
Subjective:  HPI  Physical Exam                    Objective:  System                                      TMJ Evaluation            Palpation:    Left side tenderness present at:  Superficial Masseter                        Terrell Cervical Evaluation      Movement Loss:    Flexion (Flex): nil  Retraction (RET): min  Extension (EXT): min  Lateral Flexion Right (LF R): nil  Lateral Flexion Left (LF L): nil  Rotation Right (ROT R): nil  Rotation Left (ROT L): nil                                                 ROS    Assessment/Plan:    SUBJECTIVE  Subjective changes as noted by pt:  Tony reports that his neck really hurts but that his ear continues to feel good. Notes that he views the symptoms as his neck as progress because it is centralizing.        Current pain level: 2/10     Changes in function:  Yes (See Goal flowsheet attached for changes in current functional level)     Adverse reaction to treatment or activity:  None    OBJECTIVE  Changes in objective findings:  Yes, See physical exam section and/or daily flowsheet for response to repeated movements.           ASSESSMENT  Tony continues to require intervention to meet STG and LTG's: PT  Patient is progressing as expected.  Response to therapy has shown an improvement in  pain level  Progress made towards STG/LTG?  Yes (See Goal flowsheet attached for updates on achievement of STG and LTG)    PLAN  Continue current treatment plan until patient demonstrates readiness to progress to higher level exercises.    PTA/ATC plan:  N/A    Please refer to the daily flowsheet for treatment today, total treatment time and time spent performing 1:1 timed codes.

## 2021-12-14 ENCOUNTER — THERAPY VISIT (OUTPATIENT)
Dept: PHYSICAL THERAPY | Facility: CLINIC | Age: 41
End: 2021-12-14
Attending: OTOLARYNGOLOGY
Payer: COMMERCIAL

## 2021-12-14 DIAGNOSIS — M26.609 TMJ (TEMPOROMANDIBULAR JOINT SYNDROME): ICD-10-CM

## 2021-12-14 PROCEDURE — 97112 NEUROMUSCULAR REEDUCATION: CPT | Mod: GP | Performed by: PHYSICAL THERAPIST

## 2021-12-14 PROCEDURE — 97110 THERAPEUTIC EXERCISES: CPT | Mod: GP | Performed by: PHYSICAL THERAPIST

## 2021-12-14 PROCEDURE — 97140 MANUAL THERAPY 1/> REGIONS: CPT | Mod: GP | Performed by: PHYSICAL THERAPIST

## 2021-12-14 NOTE — PROGRESS NOTES
Subjective:  HPI  Physical Exam                    Objective:  System                                      TMJ Evaluation            Palpation:    Left side tenderness present at:  Superficial Masseter                        Terrell Cervical Evaluation      Movement Loss:    Flexion (Flex): nil  Retraction (RET): nil  Extension (EXT): nil  Lateral Flexion Right (LF R): nil  Lateral Flexion Left (LF L): nil  Rotation Right (ROT R): nil  Rotation Left (ROT L): nil                                                 ROS    Assessment/Plan:    SUBJECTIVE  Subjective changes as noted by pt:  Fletcher reports that he's doing well. Has noticed that when he doesn't have his head down looking at his phone as much his nec is better. Was able to take less of the muscle relaxers this weekend and the ear didn't feel full again. Was pulling his shoe on twenty minutes ago and noticed a new strain in his L shoulder blade.        Current pain level: 0/10     Changes in function:  Yes (See Goal flowsheet attached for changes in current functional level)     Adverse reaction to treatment or activity:  None    OBJECTIVE  Changes in objective findings:  Yes, See physical exam section and/or daily flowsheet for response to repeated movements.           ASSESSMENT  Tony continues to require intervention to meet STG and LTG's: PT  Patient's symptoms are resolving.  Response to therapy has shown an improvement in  pain level  Progress made towards STG/LTG?  Yes (See Goal flowsheet attached for updates on achievement of STG and LTG)    PLAN  Continue current treatment plan until patient demonstrates readiness to progress to higher level exercises.    PTA/ATC plan:  N/A    Please refer to the daily flowsheet for treatment today, total treatment time and time spent performing 1:1 timed codes.

## 2021-12-20 ENCOUNTER — E-VISIT (OUTPATIENT)
Dept: URGENT CARE | Facility: URGENT CARE | Age: 41
End: 2021-12-20

## 2021-12-20 DIAGNOSIS — Z20.822 SUSPECTED COVID-19 VIRUS INFECTION: Primary | ICD-10-CM

## 2021-12-20 PROCEDURE — 99421 OL DIG E/M SVC 5-10 MIN: CPT | Performed by: PHYSICIAN ASSISTANT

## 2021-12-20 NOTE — PATIENT INSTRUCTIONS
Dear Tony Ortega,    Your symptoms show that you may have coronavirus (COVID-19). This illness can cause fever, cough and trouble breathing. Many people get a mild case and get better on their own. Some people can get very sick.    Will I be tested for COVID-19?  We would like to test you for Covid-19 virus. I have placed orders for this test.     To schedule: go to your [employer] home page and scroll down to the section that says  You have an appointment that needs to be scheduled  and click the large green button that says  Schedule Now  and follow the steps to find the next available openings.    If you are unable to complete these [employer] scheduling steps, please call [phone] to schedule your testing.     Return to work/school/ guidance:  Please let your workplace manager and staffing office know when your quarantine ends     We can t give you an exact date as it depends on the above. You can calculate this on your own or work with your manager/staffing office to calculate this. (For example if you were exposed on 10/4, you would have to quarantine for 14 full days. That would be through 10/18. You could return on 10/19.)      If you receive a positive COVID-19 test result, follow the guidance of the those who are giving you the results. Usually the return to work is 10 (or in some cases 20 days from symptom onset.) If you work at [facility], you must also be cleared by Employee Occupational Health and Safety to return to work.        If you receive a negative COVID-19 test result and did not have a high risk exposure to someone with a known positive COVID-19 test, you can return to work once you're free of fever for 24 hours without fever-reducing medication and your symptoms are improving or resolved.      If you receive a negative COVID-19 test and If you had a high risk exposure to someone who has tested positive for COVID-19 then you can return to work 14 days after your last contact  with the positive individual    Note: If you have ongoing exposure to the covid positive person, this quarantine period may be more than 14 days. (For example, if you are continued to be exposed to your child who tested positive and cannot isolate from them, then the quarantine of 7-14 days can't start until your child is no longer contagious. This is typically 10 days from onset of the child's symptoms. So the total duration may be 17-24 days in this case.)    Sign up for [employer].   We know it's scary to hear that you might have COVID-19. We want to track your symptoms to make sure you're okay over the next 2 weeks. Please look for an email from [employer]ada--this is a free, online program that we'll use to keep in touch. To sign up, follow the link in the email you will receive. Learn more at [email or web address]    How can I take care of myself?    Get lots of rest. Drink extra fluids (unless a doctor has told you not to)    Take Tylenol (acetaminophen) or ibuprofen for fever or pain. If you have liver or kidney problems, ask your family doctor if it's okay to take Tylenol o ibuprofen    If you have other health problems (like cancer, heart failure, an organ transplant or severe kidney disease): Call your specialty clinic if you don't feel better in the next 2 days.    Know when to call 911. Emergency warning signs include:  o Trouble breathing or shortness of breath  o Pain or pressure in the chest that doesn't go away  o Feeling confused like you haven't felt before, or not being able to wake up  o Bluish-colored lips or face    Where can I get more information?  [facility] - About COVID-19:   [email or web address]    CDC - What to Do If You're Sick:   www.cdc.gov/coronavirus/2019-ncov/about/steps-when-sick.html    December 20, 2021  RE:  Tony Ortega                                                                                                                  [id number]  [place] [facility] [address]      To whom it may concern:    I evaluated Tony Ortega on December 20, 2021. Tony Ortega should be excused from work/school.     They should let their workplace manager and staffing office know when their quarantine ends.    We can not give an exact date as it depends on the information below. They can calculate this on their own or work with their manager/staffing office to calculate this. (For example if they were exposed on 10/04, they would have to quarantine for 14 full days. That would be through 10/18. They could return on 10/19.)    Quarantine Guidelines:      If patient receives a positive COVID-19 test result, they should follow the guidance of those who are giving the results. Usually the return to work is 10 (or in some cases 20 days from symptom onset.) If they work at [employer], they must be cleared by Employee Occupational Health and Safety to return to work.        If patient receives a negative COVID-19 test result and did not have a high risk exposure to someone with a known positive COVID-19 test, they can return to work once they're free of fever for 24 hours without fever-reducing medication and their symptoms are improving or resolved.      If patient receives a negative COVID-19 test and if they had a high risk exposure to someone who has tested positive for COVID-19 then they can return to work 14 days after their last contact with the positive individual    Note: If there is ongoing exposure to the covid positive person, this quarantine period may be longer than 14 days. (For example, if they are continually exposed to their child, who tested positive and cannot isolate from them, then the quarantine of 7-14 days can't start until their child is no longer contagious. This is typically 10 days from onset to the child's symptoms. So the total duration may be 17-24 days in this case.)     Sincerely,  Babak Berrios PA-C

## 2021-12-21 ENCOUNTER — LAB (OUTPATIENT)
Dept: URGENT CARE | Facility: URGENT CARE | Age: 41
End: 2021-12-21
Attending: PHYSICIAN ASSISTANT
Payer: COMMERCIAL

## 2021-12-21 DIAGNOSIS — Z20.822 SUSPECTED COVID-19 VIRUS INFECTION: ICD-10-CM

## 2021-12-21 PROCEDURE — U0005 INFEC AGEN DETEC AMPLI PROBE: HCPCS

## 2021-12-21 PROCEDURE — U0003 INFECTIOUS AGENT DETECTION BY NUCLEIC ACID (DNA OR RNA); SEVERE ACUTE RESPIRATORY SYNDROME CORONAVIRUS 2 (SARS-COV-2) (CORONAVIRUS DISEASE [COVID-19]), AMPLIFIED PROBE TECHNIQUE, MAKING USE OF HIGH THROUGHPUT TECHNOLOGIES AS DESCRIBED BY CMS-2020-01-R: HCPCS

## 2021-12-22 LAB — SARS-COV-2 RNA RESP QL NAA+PROBE: NEGATIVE

## 2022-02-10 PROBLEM — M26.609 TMJ (TEMPOROMANDIBULAR JOINT SYNDROME): Status: RESOLVED | Noted: 2021-11-29 | Resolved: 2022-02-10

## 2022-02-10 NOTE — PROGRESS NOTES
Discharge Note    Progress reporting period is from initial evaluation date (please see noted date below) to Dec 14, 2021.  No linked episodes      Tony failed to follow up and current status is unknown.  Please see information below for last relevant information on current status.  Patient seen for 3 visits.    SUBJECTIVE  Subjective changes noted by patient:     .  Current pain level is  .     Previous pain level was   .   Changes in function:  Yes (See Goal flowsheet attached for changes in current functional level)  Adverse reaction to treatment or activity: None    OBJECTIVE  Changes noted in objective findings:       ASSESSMENT/PLAN  Diagnosis: L TMD   Updated problem list and treatment plan:   Pain - HEP  Decreased ROM/flexibility - HEP  Decreased function - HEP  Decreased strength - HEP  STG/LTGs have been met or progress has been made towards goals:  Yes, please see goal flowsheet for most current information  Assessment of Progress: current status is unknown.    Last current status:     Self Management Plans:  HEP  I have re-evaluated this patient and find that the nature, scope, duration and intensity of the therapy is appropriate for the medical condition of the patient.  Tony continues to require the following intervention to meet STG and LTG's:  HEP.    Recommendations:  Discharge with current home program.  Patient to follow up with MD as needed.    Please refer to the daily flowsheet for treatment today, total treatment time and time spent performing 1:1 timed codes.

## 2022-04-13 ENCOUNTER — OFFICE VISIT (OUTPATIENT)
Dept: FAMILY MEDICINE | Facility: CLINIC | Age: 42
End: 2022-04-13
Payer: COMMERCIAL

## 2022-04-13 VITALS
RESPIRATION RATE: 18 BRPM | DIASTOLIC BLOOD PRESSURE: 86 MMHG | BODY MASS INDEX: 35.35 KG/M2 | SYSTOLIC BLOOD PRESSURE: 133 MMHG | OXYGEN SATURATION: 95 % | HEART RATE: 80 BPM | HEIGHT: 72 IN | WEIGHT: 261 LBS | TEMPERATURE: 98.2 F

## 2022-04-13 DIAGNOSIS — I10 HYPERTENSION GOAL BP (BLOOD PRESSURE) < 140/90: ICD-10-CM

## 2022-04-13 DIAGNOSIS — E66.01 MORBID OBESITY (H): ICD-10-CM

## 2022-04-13 DIAGNOSIS — Z11.59 NEED FOR HEPATITIS C SCREENING TEST: ICD-10-CM

## 2022-04-13 DIAGNOSIS — Z11.4 SCREENING FOR HIV (HUMAN IMMUNODEFICIENCY VIRUS): ICD-10-CM

## 2022-04-13 DIAGNOSIS — M79.674 PAIN OF TOE OF RIGHT FOOT: Primary | ICD-10-CM

## 2022-04-13 LAB
ANION GAP SERPL CALCULATED.3IONS-SCNC: 4 MMOL/L (ref 3–14)
BUN SERPL-MCNC: 15 MG/DL (ref 7–30)
CALCIUM SERPL-MCNC: 9.3 MG/DL (ref 8.5–10.1)
CHLORIDE BLD-SCNC: 107 MMOL/L (ref 94–109)
CHOLEST SERPL-MCNC: 142 MG/DL
CO2 SERPL-SCNC: 27 MMOL/L (ref 20–32)
CREAT SERPL-MCNC: 1.05 MG/DL (ref 0.66–1.25)
CREAT UR-MCNC: 49 MG/DL
FASTING STATUS PATIENT QL REPORTED: NO
GFR SERPL CREATININE-BSD FRML MDRD: >90 ML/MIN/1.73M2
GLUCOSE BLD-MCNC: 109 MG/DL (ref 70–99)
HBA1C MFR BLD: 5.5 % (ref 0–5.6)
HDLC SERPL-MCNC: 33 MG/DL
LDLC SERPL CALC-MCNC: 79 MG/DL
MICROALBUMIN UR-MCNC: <5 MG/L
MICROALBUMIN/CREAT UR: NORMAL MG/G{CREAT}
NONHDLC SERPL-MCNC: 109 MG/DL
POTASSIUM BLD-SCNC: 4.2 MMOL/L (ref 3.4–5.3)
SODIUM SERPL-SCNC: 138 MMOL/L (ref 133–144)
TRIGL SERPL-MCNC: 152 MG/DL
URATE SERPL-MCNC: 8.8 MG/DL (ref 3.5–7.2)

## 2022-04-13 PROCEDURE — 36415 COLL VENOUS BLD VENIPUNCTURE: CPT | Performed by: PREVENTIVE MEDICINE

## 2022-04-13 PROCEDURE — 80048 BASIC METABOLIC PNL TOTAL CA: CPT | Performed by: PREVENTIVE MEDICINE

## 2022-04-13 PROCEDURE — 80061 LIPID PANEL: CPT | Performed by: PREVENTIVE MEDICINE

## 2022-04-13 PROCEDURE — 83036 HEMOGLOBIN GLYCOSYLATED A1C: CPT | Performed by: PREVENTIVE MEDICINE

## 2022-04-13 PROCEDURE — 99214 OFFICE O/P EST MOD 30 MIN: CPT | Performed by: PREVENTIVE MEDICINE

## 2022-04-13 PROCEDURE — 84550 ASSAY OF BLOOD/URIC ACID: CPT | Performed by: PREVENTIVE MEDICINE

## 2022-04-13 PROCEDURE — 82043 UR ALBUMIN QUANTITATIVE: CPT | Performed by: PREVENTIVE MEDICINE

## 2022-04-13 ASSESSMENT — PAIN SCALES - GENERAL: PAINLEVEL: MILD PAIN (2)

## 2022-04-13 NOTE — RESULT ENCOUNTER NOTE
Tony,     Three month glucose number is not showing diabetes or pre diabetes.  Other labs are pending.     Please do not hesitate to call us at [phone] if you have any questions or concerns.    Thank you,    Wendy Carranza MD MPH

## 2022-04-13 NOTE — PROGRESS NOTES
X ray toe    Assessment & Plan     Pain of toe of right foot  -symptoms for 4 months  -no specific trauma/injuries  -differentials considered include tendinitis, gout, osteoarthritis   - Uric acid  - XR Toe Right G/E 2 Views  - Ankle/Foot Bracing Supplies Order  - Uric acid  -X rays are not showing any bony abnormalities  -Ice application  -post op shoe provided, use for 2 weeks  -if symptoms not resolved in 4 weeks then refer to Podiatry   -NSAIDs over the counter as needed  -Buddy taping also discussed      Morbid obesity (H)  -motivated to exercise but limited due to toe pain, would like to get back to exercising   - Hemoglobin A1c    Wt Readings from Last 2 Encounters:   04/13/22 118.4 kg (261 lb)   11/19/21 117 kg (258 lb)       Hypertension goal BP (blood pressure) < 140/90  -at goal  -continue current medication   -due for labs   - Lipid panel reflex to direct LDL Non-fasting  - Albumin Random Urine Quantitative with Creat Ratio  - Basic metabolic panel  - Hemoglobin A1c    Screening for HIV (human immunodeficiency virus)  -done previously, defer testing today    Need for hepatitis C screening test  -done previously, defer testing today               BMI:   Estimated body mass index is 35.4 kg/m  as calculated from the following:    Height as of this encounter: 1.829 m (6').    Weight as of this encounter: 118.4 kg (261 lb).   Weight management plan: Discussed healthy diet and exercise guidelines      Return in about 4 weeks (around 5/11/2022) if symptoms worsen or fail to improve.  Declined Covid booster.     Wendy Carranza MD MPH    [facility]    Maikel Jimenez is a 42 year old who presents for the following health issues :    Pain    History of Present Illness       Reason for visit:  Right foot has had a dull ache since january  Symptom onset:  More than a month  Symptoms include:  Dull ache in my right foot that gets worse with use  Symptom intensity:  Mild  Symptom  progression:  Staying the same  Had these symptoms before:  No  What makes it worse:  Using the foot  What makes it better:  Motrin and ice    He eats 2-3 servings of fruits and vegetables daily.He consumes 0 sweetened beverage(s) daily.He exercises with enough effort to increase his heart rate 20 to 29 minutes per day.  He exercises with enough effort to increase his heart rate 5 days per week.   He is taking medications regularly.     4 months of symptoms  More so on the big toe  Started exercising more and wondering is this may have triggered the pain   Aching+  Has been staying off the foot  Not resolving  Wants to get back to exercise  No injuries or trauma  No skin changes, no redness  Some edema more with use  No rash  Pain does not wake him up at night  No numbness  May feel a stabbing pain on the side of the joint  Increased with walking and climbing stairs, especially extending toe   No history of gout  No other joints affected  No fever or chills   At home wears socks only and no shoes   Shoe size 11 wide.     Hypertension Follow-up      Do you check your blood pressure regularly outside of the clinic? Yes     Are you following a low salt diet? Yes    Are your blood pressures ever more than 140 on the top number (systolic) OR more   than 90 on the bottom number (diastolic), for example 140/90? No      Review of Systems   Constitutional, HEENT, cardiovascular, pulmonary, gi and gu systems are negative, except as otherwise noted.      Objective    /86 (BP Location: Left arm, Patient Position: Chair, Cuff Size: Adult Regular)   Pulse 80   Temp 98.2  F (36.8  C) (Tympanic)   Resp 18   Ht 1.829 m (6')   Wt 118.4 kg (261 lb)   SpO2 95%   BMI 35.40 kg/m    Body mass index is 35.4 kg/m .  Physical Exam   GENERAL APPEARANCE: healthy, alert and no distress  EYES: Eyes grossly normal to inspection and conjunctivae and sclerae normal  RESP: lungs clear to auscultation - no rales, rhonchi or wheezes  CV:  regular rates and rhythm, normal S1 S2  ABDOMEN: soft, non-tender  MS: extremities normal- no gross deformities noted and peripheral pulses normal  SKIN: no suspicious lesions or rashes  NEURO: Normal strength and tone, mentation intact and speech normal, able to ambulate without problems   PSYCH: mentation appears normal  Right foot: no gross deformities. Strength and range of motion intact, sensation intact, neurovascular intact.  No rash, capillary refill less than 2 seconds,   Great toe extension normal. Mild tenderness at the first MTP joint.     Results for orders placed or performed in visit on 04/13/22 (from the past 24 hour(s))   Hemoglobin A1c   Result Value Ref Range    Hemoglobin A1C 5.5 0.0 - 5.6 %       EXAM: RIGHT TOE TWO OR MORE VIEWS   DATE/TIME: 4/13/2022 11:06 AM      INDICATION: Right first toe pain.   COMPARISON: None.                                                                      IMPRESSION:  1.  Normal joint spacing and alignment.  2.  No fracture.     [facility] AGOSTO MD         DME (Durable Medical Equipment) Orders and Documentation  Orders Placed This Encounter   Procedures     Ankle/Foot Bracing Supplies Order      The patient was assessed and it was determined the patient is in need of the following listed DME Supplies/Equipment. Please complete supporting documentation below to demonstrate medical necessity.      Ankle/Foot Bracing Supplies Documentation  Patient requires the use of the ordered bracing device due to following medical need/condition: Pain of right big toe.

## 2022-04-13 NOTE — PATIENT INSTRUCTIONS
Aim for 150 minutes of moderate physical activity per week.   Use post op shoe for at least 2 weeks  Avoid going barefoot at home

## 2022-04-15 NOTE — RESULT ENCOUNTER NOTE
Dear Tony Oretga    Here are your cholesterol results:    Your LDL is: Lab Results       Component                Value               Date                       LDL                      79                  04/13/2022                 LDL                      77                  09/16/2015          Your LDL goal is to be less than 130  Your HDL is: Lab Results       Component                Value               Date                       HDL                      33                  04/13/2022                 HDL                      33                  09/16/2015           Goal HDL is Greater than 40 (for men) or 50 (for women).  Your Triglycerides are: Lab Results       Component                Value               Date                       TRIG                     152                 04/13/2022                 TRIG                     168                 09/16/2015         Goal TRIGLYCERIDES are less than 150.       Here are some ways to improve your cholesterol without medication:    Try to get at least 45 minutes of aerobic exercise 5-6 days a week  Maintain a healthy body weight  Eat less saturated fats  Buy lean cuts of meat, reduce your portions of red meat or substitute poultry or fish  Avoid fried or fast foods that are high in fat  Eat more fruits and vegetables    Urine sample is not showing any abnormal protein.  Electrolytes and kidney function are normal.    Uric acid level for gout is elevated. This indicates that the pain in the big toe can be from gout. If the pain is not improving then I would recommend starting medication to lower the uric acid levels. The following is a link from the [facility] with information on dietary changes for gout:    [email or web address]-[email or web address]-[email or web address]-depth/gout-diet/art-20627928      Please do not hesitate to call us at [phone] if you have any questions or concerns.    Thank you,    Wendy Carranza MD MPH

## 2022-05-08 ENCOUNTER — HEALTH MAINTENANCE LETTER (OUTPATIENT)
Age: 42
End: 2022-05-08

## 2022-08-17 ENCOUNTER — OFFICE VISIT (OUTPATIENT)
Dept: URGENT CARE | Facility: URGENT CARE | Age: 42
End: 2022-08-17
Payer: COMMERCIAL

## 2022-08-17 VITALS
HEART RATE: 68 BPM | TEMPERATURE: 97.8 F | WEIGHT: 264.2 LBS | OXYGEN SATURATION: 96 % | BODY MASS INDEX: 35.83 KG/M2 | SYSTOLIC BLOOD PRESSURE: 132 MMHG | DIASTOLIC BLOOD PRESSURE: 84 MMHG

## 2022-08-17 DIAGNOSIS — M10.9 ACUTE GOUT INVOLVING TOE OF LEFT FOOT, UNSPECIFIED CAUSE: Primary | ICD-10-CM

## 2022-08-17 DIAGNOSIS — I10 HYPERTENSION, WELL CONTROLLED: ICD-10-CM

## 2022-08-17 LAB — URATE SERPL-MCNC: 7.2 MG/DL (ref 3.5–7.2)

## 2022-08-17 PROCEDURE — 36415 COLL VENOUS BLD VENIPUNCTURE: CPT | Performed by: INTERNAL MEDICINE

## 2022-08-17 PROCEDURE — 84550 ASSAY OF BLOOD/URIC ACID: CPT | Performed by: INTERNAL MEDICINE

## 2022-08-17 PROCEDURE — 99213 OFFICE O/P EST LOW 20 MIN: CPT | Performed by: INTERNAL MEDICINE

## 2022-08-17 RX ORDER — PREDNISONE 20 MG/1
40 TABLET ORAL DAILY
Qty: 10 TABLET | Refills: 0 | Status: SHIPPED | OUTPATIENT
Start: 2022-08-17 | End: 2022-08-22

## 2022-08-17 NOTE — PATIENT INSTRUCTIONS
Post op shoe    Uric acid level    Recheck 1 week with primary to consider allopurinol as preventative  Prednisone for treatment (chose due to hypertension) 40 mg daily for 5 day

## 2022-08-17 NOTE — PROGRESS NOTES
ASSESSMENT AND PLAN:      ICD-10-CM    1. Acute gout involving toe of left foot, unspecified cause  M10.9 Uric acid     Ankle/Foot Bracing Supplies Order for DME - ONLY FOR DME     predniSONE (DELTASONE) 20 MG tablet     Uric acid   2. Hypertension, well controlled  I10      165/98 recheck blood pressure was normal    BP Readings from Last 6 Encounters:   08/17/22 132/84   04/13/22 133/86   11/19/21 136/87   09/15/20 131/85   03/09/20 (!) 154/92   03/04/20 136/88       treatment for presumed gout.   Given postop shoe to avoid bending joint  Consider Podiatry if treatment not working for evaluation of foot pain    Patient Instructions     Post op shoe    Uric acid level    Recheck 1 week with primary to consider allopurinol as preventative  Prednisone for treatment (chose due to hypertension) 40 mg daily for 5 day                  Return in about 1 week (around 8/24/2022).    Addendum  Left detailed phone message  Uric acid level improved to 7.2.  From 8.8, patient had stated he has been treating gout with lifestyle changes after his initial episode.  Mentioned in message that 7.2 is still high normal and symptoms still most likely presumed gout.  If not improved with treatment recommendations, see podiatry    Mariya Bender MD  [facility]    Subjective     Tony Ortega is a 42 year old who presents for Patient presents with:  Musculoskeletal Problem: Lt foot pain. For about a month. 2nd digit is swollen, red. No injury can remember.     an established patient of [facility].  MS /Pain    Onset of symptoms was 1 month(s) ago.  Location: left foot  Context:       no injury     Course of symptoms is waxing and waning.    Current and Associated symptoms: Pain, Swelling, Redness and Decreased range of motion  Denies  Warmth  Aggravating Factors: movement  Therapies to improve symptoms include: ice, ibuprofen and rest  This is the first time this type of problem has occurred for this patient.      Hx gout right big toe      Component      Latest Ref Rng & Units 4/13/2022   Sodium      133 - 144 mmol/L 138   Potassium      3.4 - 5.3 mmol/L 4.2   Chloride      94 - 109 mmol/L 107   Carbon Dioxide      20 - 32 mmol/L 27   Anion Gap      3 - 14 mmol/L 4   Urea Nitrogen      7 - 30 mg/dL 15   Creatinine      0.66 - 1.25 mg/dL 1.05   Calcium      8.5 - 10.1 mg/dL 9.3   Glucose      70 - 99 mg/dL 109 (H)   GFR Estimate      >60 mL/min/1.73m2 >90           Objective    /84 (BP Location: Left arm, Patient Position: Sitting, Cuff Size: Adult Large)   Pulse 68   Temp 97.8  F (36.6  C) (Tympanic)   Wt 119.8 kg (264 lb 3.2 oz)   SpO2 96%   BMI 35.83 kg/m    Physical Exam  Vitals reviewed.   Constitutional:       Appearance: Normal appearance.   Neurological:      Mental Status: He is alert.          Swelling distal foot near 2 & 3rd toe  & webbing   Faint redness noted  Pain greatest at 2nd MCT joint

## 2022-10-14 DIAGNOSIS — I10 HYPERTENSION GOAL BP (BLOOD PRESSURE) < 140/90: ICD-10-CM

## 2022-10-14 RX ORDER — LISINOPRIL 10 MG/1
10 TABLET ORAL DAILY
Qty: 90 TABLET | Refills: 0 | Status: SHIPPED | OUTPATIENT
Start: 2022-10-14 | End: 2023-01-18

## 2023-01-14 ENCOUNTER — HEALTH MAINTENANCE LETTER (OUTPATIENT)
Age: 43
End: 2023-01-14

## 2023-01-18 DIAGNOSIS — I10 HYPERTENSION GOAL BP (BLOOD PRESSURE) < 140/90: ICD-10-CM

## 2023-01-18 RX ORDER — LISINOPRIL 10 MG/1
10 TABLET ORAL DAILY
Qty: 90 TABLET | Refills: 0 | Status: SHIPPED | OUTPATIENT
Start: 2023-01-18 | End: 2023-04-13

## 2023-05-10 ENCOUNTER — E-VISIT (OUTPATIENT)
Dept: URGENT CARE | Facility: CLINIC | Age: 43
End: 2023-05-10
Payer: COMMERCIAL

## 2023-05-10 ENCOUNTER — LAB (OUTPATIENT)
Dept: URGENT CARE | Facility: URGENT CARE | Age: 43
End: 2023-05-10
Attending: PHYSICIAN ASSISTANT
Payer: COMMERCIAL

## 2023-05-10 DIAGNOSIS — J02.9 SORE THROAT: ICD-10-CM

## 2023-05-10 DIAGNOSIS — Z20.822 SUSPECTED COVID-19 VIRUS INFECTION: ICD-10-CM

## 2023-05-10 DIAGNOSIS — J02.0 STREPTOCOCCAL PHARYNGITIS: Primary | ICD-10-CM

## 2023-05-10 LAB — DEPRECATED S PYO AG THROAT QL EIA: POSITIVE

## 2023-05-10 PROCEDURE — U0005 INFEC AGEN DETEC AMPLI PROBE: HCPCS

## 2023-05-10 PROCEDURE — U0003 INFECTIOUS AGENT DETECTION BY NUCLEIC ACID (DNA OR RNA); SEVERE ACUTE RESPIRATORY SYNDROME CORONAVIRUS 2 (SARS-COV-2) (CORONAVIRUS DISEASE [COVID-19]), AMPLIFIED PROBE TECHNIQUE, MAKING USE OF HIGH THROUGHPUT TECHNOLOGIES AS DESCRIBED BY CMS-2020-01-R: HCPCS

## 2023-05-10 PROCEDURE — 87880 STREP A ASSAY W/OPTIC: CPT

## 2023-05-10 PROCEDURE — 99421 OL DIG E/M SVC 5-10 MIN: CPT | Mod: CS | Performed by: PHYSICIAN ASSISTANT

## 2023-05-10 RX ORDER — AMOXICILLIN 500 MG/1
1000 CAPSULE ORAL DAILY
Qty: 20 CAPSULE | Refills: 0 | Status: SHIPPED | OUTPATIENT
Start: 2023-05-10 | End: 2023-05-20

## 2023-05-10 NOTE — PATIENT INSTRUCTIONS
Dear Tony,    Your symptoms show that you may have COVID-19.     Because you also reported sore throat, I would like to also test you for strep throat to determine if we need to treat you for that as well.    What should I do?  We would like to test you for COVID-19 virus and Strep Throat. I have placed orders for these tests.   To schedule: go to your [employer] home page and scroll down to the section that says  You have an appointment that needs to be scheduled  and click the large green button that says  Schedule Now  and follow the steps to find the next available openings. It is important that when you are asked what the reason for your appointment is that you mention you need BOTH COVID and Strep tests.    If you are unable to complete these [facility] scheduling steps, please call [phone] to schedule your testing.     How do I self-isolate?  You isolate when you have symptoms of COVID or a test shows you have COVID, even if you don t have symptoms.     If you DO have symptoms:  o Stay home and away from others  - For at least 5 days after your symptoms started, AND   - You are fever free for 24 hours (with no medicine that reduces fever), AND  - Your other symptoms are better.  o Wear a mask for 10 full days any time you are around others.    If you DON T have symptoms:  o Stay at home and away from others for at least 5 days after your positive test.  o Wear a mask for 10 full days any time you are around others.    How can I take care of myself?  Over the counter medications may help with your symptoms such as runny or stuffy nose, cough, chills, or fever. Talk to your care team about your options.   Some people are at high risk of severe illness (for example, you have a weak immune system, you re 50 years or older, or you have certain medical problems). If your risk is high and your symptoms started in the last 5 days, we strongly recommend for you to get COVID treatment as soon as possible. There are  "safe and effective medicines that can make you feel better faster, and prevent hospitalization and death.       To discuss COVID treatment you can:    Call your clinic OR [phone] ([phone]) and ask to speak to a nurse about a positive COVID test.    Send a Bizen message to your care team. In Bizen select \"Ask a Medical Question\"  Then \"Do I need an appointment\" and put \"COVID\" in the subject line. Please include a phone number to call you back in the message.         Get lots of rest. Drink extra fluids (unless a doctor has told you not to)    Take Tylenol (acetaminophen) or ibuprofen for fever or pain. If you have liver or kidney problems, ask your family doctor if it's okay to take Tylenol or ibuprofen    Take over the counter medications for your symptoms, as directed by your doctor. You may also talk to your pharmacist.      If you have other health problems (like cancer, heart failure, an organ transplant or severe kidney disease): Call your specialty clinic if you don't feel better in the next 2 days.    Know when to call 911. Emergency warning signs include:  o Trouble breathing or shortness of breath  o Pain or pressure in the chest that doesn't go away  o Feeling confused like you haven't felt before, or not being able to wake up  o Bluish-colored lips or face    Where can I get more information?    [facility] - About COVID-19: [email or web address]     CDC - What to Do If You're Sick: https://www.cdc.gov/coronavirus/2019-ncov/if-you-are-sick/index.html    CDC -  Isolation https://www.cdc.gov/coronavirus/2019-ncov/your-health/isolation.html  "

## 2023-05-11 LAB — SARS-COV-2 RNA RESP QL NAA+PROBE: NEGATIVE

## 2023-05-21 ENCOUNTER — OFFICE VISIT (OUTPATIENT)
Dept: URGENT CARE | Facility: URGENT CARE | Age: 43
End: 2023-05-21
Payer: COMMERCIAL

## 2023-05-21 VITALS
HEART RATE: 92 BPM | BODY MASS INDEX: 34.83 KG/M2 | OXYGEN SATURATION: 96 % | WEIGHT: 256.8 LBS | RESPIRATION RATE: 20 BRPM | TEMPERATURE: 97.9 F | DIASTOLIC BLOOD PRESSURE: 87 MMHG | SYSTOLIC BLOOD PRESSURE: 148 MMHG

## 2023-05-21 DIAGNOSIS — M10.9 ACUTE GOUT INVOLVING TOE OF RIGHT FOOT, UNSPECIFIED CAUSE: Primary | ICD-10-CM

## 2023-05-21 PROCEDURE — 99213 OFFICE O/P EST LOW 20 MIN: CPT | Performed by: PHYSICIAN ASSISTANT

## 2023-05-21 RX ORDER — COLCHICINE 0.6 MG/1
0.6 TABLET ORAL DAILY
Qty: 30 TABLET | Refills: 0 | Status: SHIPPED | OUTPATIENT
Start: 2023-05-21 | End: 2023-07-28

## 2023-05-21 ASSESSMENT — ENCOUNTER SYMPTOMS
ABDOMINAL PAIN: 0
ALLERGIC/IMMUNOLOGIC NEGATIVE: 1
FEVER: 0
RESPIRATORY NEGATIVE: 1
FREQUENCY: 0
MYALGIAS: 0
NAUSEA: 0
DYSURIA: 0
HEMATURIA: 0
VOMITING: 0
CHILLS: 0
CONSTITUTIONAL NEGATIVE: 1
SHORTNESS OF BREATH: 0
NEUROLOGICAL NEGATIVE: 1
ARTHRALGIAS: 1
PALPITATIONS: 0
SORE THROAT: 0
DIARRHEA: 0
HEADACHES: 0
CARDIOVASCULAR NEGATIVE: 1
CHEST TIGHTNESS: 0
GASTROINTESTINAL NEGATIVE: 1
COUGH: 0
WHEEZING: 0

## 2023-05-21 NOTE — PROGRESS NOTES
Chief Complaint:     Chief Complaint   Patient presents with     Toe Pain     Big toe; concerned it maybe a gout flareup        ASSESSMENT     1. Acute gout involving toe of right foot, unspecified cause       PLAN    Rx for Colchicine sent in  RICE discussed  Patient given handout on gout care.  Recommended rest and avoidance of activities which cause pain or swelling.  Patient verbalized understanding, and agrees with this plan.       HPI: Tony Ortega is an 43 year old male who presents today for evaluation of possible R Great toe gout flare up.  Patient noticed redness, swelling and pain in the R great toe 4 days ago.  He has had gout in the past and states that this feels the same.  No acute injury to the Toe.    Patient denies any numbness, tingling, or dysfunction of the R toe.      ROS:      Review of Systems   Constitutional: Negative.  Negative for chills and fever.   HENT: Negative.  Negative for sore throat.    Respiratory: Negative.  Negative for cough, chest tightness, shortness of breath and wheezing.    Cardiovascular: Negative.  Negative for chest pain and palpitations.   Gastrointestinal: Negative.  Negative for abdominal pain, diarrhea, nausea and vomiting.   Genitourinary: Negative for dysuria, frequency, hematuria and urgency.   Musculoskeletal: Positive for arthralgias. Negative for myalgias.   Skin: Negative for rash.   Allergic/Immunologic: Negative.  Negative for immunocompromised state.   Neurological: Negative.  Negative for headaches.        Problem history  Patient Active Problem List   Diagnosis     Lipid screening     Obesity, Class I, BMI 30-34.9     Screening for diabetes mellitus     Cervicalgia     Neck pain on left side     Left shoulder pain     Chronic periscapular pain on left side     Dizziness     Hypertension goal BP (blood pressure) < 140/90     Morbid obesity (H)        Allergies  No Known Allergies     Smoking History  History   Smoking Status     Former      Packs/day: 1.50     Years: 10.00     Types: Cigarettes     Start date: 8/1/2000     Quit date: 3/9/2012   Smokeless Tobacco     Never        Current Meds    Current Outpatient Medications:      colchicine (COLCYRS) 0.6 MG tablet, Take 1 tablet (0.6 mg) by mouth daily, Disp: 30 tablet, Rfl: 0     lisinopril (ZESTRIL) 10 MG tablet, Take 1 tablet (10 mg) by mouth daily, Disp: 15 tablet, Rfl: 0        Vital signs reviewed by Jaleel Gary PA-C  BP (!) 148/87   Pulse 92   Temp 97.9  F (36.6  C) (Tympanic)   Resp 20   Wt 116.5 kg (256 lb 12.8 oz)   SpO2 96%   BMI 34.83 kg/m      Physical Exam     Physical Exam  Vitals and nursing note reviewed.   Constitutional:       General: He is not in acute distress.     Appearance: He is well-developed. He is not ill-appearing, toxic-appearing or diaphoretic.   HENT:      Head: Normocephalic and atraumatic.      Right Ear: Hearing, tympanic membrane, ear canal and external ear normal. Tympanic membrane is not perforated, erythematous, retracted or bulging.      Left Ear: Hearing, tympanic membrane, ear canal and external ear normal. Tympanic membrane is not perforated, erythematous, retracted or bulging.      Nose: Nose normal. No mucosal edema, congestion or rhinorrhea.      Mouth/Throat:      Pharynx: No oropharyngeal exudate or posterior oropharyngeal erythema.      Tonsils: No tonsillar exudate or tonsillar abscesses. 0 on the right. 0 on the left.   Eyes:      Pupils: Pupils are equal, round, and reactive to light.   Cardiovascular:      Rate and Rhythm: Normal rate and regular rhythm.      Heart sounds: Normal heart sounds, S1 normal and S2 normal. Heart sounds not distant. No murmur heard.     No friction rub. No gallop.   Pulmonary:      Effort: Pulmonary effort is normal. No respiratory distress.      Breath sounds: Normal breath sounds. No decreased breath sounds, wheezing, rhonchi or rales.   Abdominal:      General: Bowel sounds are normal. There is no  distension.      Palpations: Abdomen is soft.      Tenderness: There is no abdominal tenderness.   Musculoskeletal:      Cervical back: Normal range of motion and neck supple.      Right foot: Normal range of motion and normal capillary refill. Swelling, tenderness and bony tenderness present. No deformity or crepitus. Normal pulse.      Comments: Redness, swelling, and tenderness of the R great toe.   Lymphadenopathy:      Cervical: No cervical adenopathy.   Skin:     General: Skin is warm and dry.      Findings: No rash.   Neurological:      Mental Status: He is alert.      Cranial Nerves: No cranial nerve deficit.   Psychiatric:         Attention and Perception: He is attentive.         Speech: Speech normal.         Behavior: Behavior normal. Behavior is cooperative.         Thought Content: Thought content normal.         Judgment: Judgment normal.             Jaleel Gary PA-C  5/21/2023, 2:50 PM

## 2023-06-02 ENCOUNTER — VIRTUAL VISIT (OUTPATIENT)
Dept: FAMILY MEDICINE | Facility: CLINIC | Age: 43
End: 2023-06-02
Payer: COMMERCIAL

## 2023-06-02 ENCOUNTER — HEALTH MAINTENANCE LETTER (OUTPATIENT)
Age: 43
End: 2023-06-02

## 2023-06-02 DIAGNOSIS — I10 HYPERTENSION GOAL BP (BLOOD PRESSURE) < 140/90: ICD-10-CM

## 2023-06-02 PROCEDURE — 99213 OFFICE O/P EST LOW 20 MIN: CPT | Mod: VID | Performed by: FAMILY MEDICINE

## 2023-06-02 RX ORDER — COVID-19 ANTIGEN TEST
220 KIT MISCELLANEOUS 2 TIMES DAILY WITH MEALS
COMMUNITY
End: 2023-07-28

## 2023-06-02 RX ORDER — LISINOPRIL 10 MG/1
10 TABLET ORAL DAILY
Qty: 90 TABLET | Refills: 0 | Status: SHIPPED | OUTPATIENT
Start: 2023-06-02 | End: 2023-09-11

## 2023-06-02 NOTE — PROGRESS NOTES
Tony is a 43 year old who is being evaluated via a billable video visit.      How would you like to obtain your AVS? MyChart  If the video visit is dropped, the invitation should be resent by: Text to cell phone: [phone]  Will anyone else be joining your video visit? No          Assessment & Plan     Hypertension goal BP (blood pressure) < 140/90  Currently on lisinopril 10 mg daily.  Tolerating medication without side effects.  Due for lab work.  Reports time to get with primary for next couple months.  --Refilled lisinopril 10 mg daily for 90 days provided.  Discussed due for labs and will obtain creatinine and potassium.  Advise he follow-up with primary for recheck of blood pressure.  He has been checking at home intermittently and blood pressure has been in the 130/80 range.  Currently asymptomatic.  - Creatinine  - Potassium  - lisinopril (ZESTRIL) 10 MG tablet  Dispense: 90 tablet; Refill: 0    The risks, benefits and treatment options of prescribed medications or other treatments have been discussed with the patient. The patient verbalized their understanding and should call or follow up if no improvement or if they develop further problems.      Eduar Jerome, [facility]   Tony is a 43 year old, presenting for the following health issues:  Hypertension    History of Present Illness       Hypertension: He presents for follow up of hypertension.  He does check blood pressure  regularly outside of the clinic. Outpatient blood pressures have not been over 140/90. He does not follow a low salt diet.     He eats 4 or more servings of fruits and vegetables daily.He consumes 0 sweetened beverage(s) daily.He exercises with enough effort to increase his heart rate 30 to 60 minutes per day.  He exercises with enough effort to increase his heart rate 3 or less days per week.   He is taking medications regularly.     43-year-old male who has a virtual appointment for  medication refill    Hypertension  Lisinopril 10 mg daily  Last BP in chart showing 148/87  Due for lab work as last BMP completed 4/13/2022  Checking BP at home  Typically 130/80 range.  No chest pain or shortness of breath  Tolerated medication without any side effects  Reports he is unable to get in with his primary until end of July.      Review of Systems   Constitutional, HEENT, cardiovascular, pulmonary, gi and gu systems are negative, except as otherwise noted.      Objective    Vitals - Patient Reported  Weight (Patient Reported): 111.5 kg (245 lb 14.4 oz)  Pain Score: No Pain (1)  Pain Loc: Foot      Vitals:  No vitals were obtained today due to virtual visit.    Physical Exam   GENERAL: Healthy, alert and no distress  EYES: Eyes grossly normal to inspection.  No discharge or erythema, or obvious scleral/conjunctival abnormalities.  RESP: No audible wheeze, cough, or visible cyanosis.  No visible retractions or increased work of breathing.    SKIN: Visible skin clear. No significant rash, abnormal pigmentation or lesions.  NEURO: Cranial nerves grossly intact.  Mentation and speech appropriate for age.  PSYCH: Mentation appears normal, affect normal/bright, judgement and insight intact, normal speech and appearance well-groomed.          Video-Visit Details    Type of service:  Video Visit     Originating Location (pt. Location): Home    Distant Location (provider location):  On-site  Platform used for Video Visit: [employer]

## 2023-06-09 ENCOUNTER — LAB (OUTPATIENT)
Dept: LAB | Facility: CLINIC | Age: 43
End: 2023-06-09
Payer: COMMERCIAL

## 2023-06-09 DIAGNOSIS — I10 HYPERTENSION GOAL BP (BLOOD PRESSURE) < 140/90: ICD-10-CM

## 2023-06-09 LAB
CREAT SERPL-MCNC: 0.97 MG/DL (ref 0.67–1.17)
GFR SERPL CREATININE-BSD FRML MDRD: >90 ML/MIN/1.73M2
POTASSIUM SERPL-SCNC: 4.2 MMOL/L (ref 3.4–5.3)

## 2023-06-09 PROCEDURE — 36415 COLL VENOUS BLD VENIPUNCTURE: CPT

## 2023-06-09 PROCEDURE — 82565 ASSAY OF CREATININE: CPT

## 2023-06-09 PROCEDURE — 84132 ASSAY OF SERUM POTASSIUM: CPT

## 2023-07-28 ENCOUNTER — OFFICE VISIT (OUTPATIENT)
Dept: FAMILY MEDICINE | Facility: CLINIC | Age: 43
End: 2023-07-28
Payer: COMMERCIAL

## 2023-07-28 VITALS
DIASTOLIC BLOOD PRESSURE: 86 MMHG | OXYGEN SATURATION: 99 % | SYSTOLIC BLOOD PRESSURE: 138 MMHG | HEIGHT: 71 IN | HEART RATE: 69 BPM | RESPIRATION RATE: 17 BRPM | TEMPERATURE: 98.2 F | WEIGHT: 248 LBS | BODY MASS INDEX: 34.72 KG/M2

## 2023-07-28 DIAGNOSIS — I10 PRIMARY HYPERTENSION: ICD-10-CM

## 2023-07-28 DIAGNOSIS — Z00.00 ROUTINE GENERAL MEDICAL EXAMINATION AT A HEALTH CARE FACILITY: Primary | ICD-10-CM

## 2023-07-28 DIAGNOSIS — Z23 ENCOUNTER FOR IMMUNIZATION: ICD-10-CM

## 2023-07-28 PROCEDURE — 99396 PREV VISIT EST AGE 40-64: CPT | Performed by: FAMILY MEDICINE

## 2023-07-28 ASSESSMENT — ENCOUNTER SYMPTOMS
DIARRHEA: 0
HEARTBURN: 0
SHORTNESS OF BREATH: 0
JOINT SWELLING: 1
EYE PAIN: 0
NAUSEA: 0
ABDOMINAL PAIN: 0
HEADACHES: 0
FREQUENCY: 0
PARESTHESIAS: 0
HEMATOCHEZIA: 0
COUGH: 0
DIZZINESS: 0
CHILLS: 0
DYSURIA: 0
HEMATURIA: 0
SORE THROAT: 0
FEVER: 0
WEAKNESS: 0
CONSTIPATION: 0
ARTHRALGIAS: 1
PALPITATIONS: 0
MYALGIAS: 0

## 2023-07-28 ASSESSMENT — PAIN SCALES - GENERAL: PAINLEVEL: NO PAIN (0)

## 2023-07-28 NOTE — PROGRESS NOTES
SUBJECTIVE:   CC: Tony is an 43 year old who presents for preventative health visit.       2023     3:45 PM   Additional Questions   Roomed by Nisa   Accompanied by self       Healthy Habits:     Getting at least 3 servings of Calcium per day:  Yes    Bi-annual eye exam:  NO    Dental care twice a year:  Yes    Sleep apnea or symptoms of sleep apnea:  None    Diet:  Breakfast skipped    Frequency of exercise:  2-3 days/week    Duration of exercise:  Greater than 60 minutes    Taking medications regularly:  Yes    Medication side effects:  None    Additional concerns today:  Yes                  Have you ever done Advance Care Planning? (For example, a Health Directive, POLST, or a discussion with a medical provider or your loved ones about your wishes): No, advance care planning information given to patient to review.  Patient declined advance care planning discussion at this time.    Social History     Tobacco Use    Smoking status: Former     Packs/day: 1.50     Years: 10.00     Pack years: 15.00     Types: Cigarettes     Start date: 2000     Quit date: 3/9/2012     Years since quittin.3    Smokeless tobacco: Never   Substance Use Topics    Alcohol use: Yes     Comment: Occasionally             2023     1:25 PM   Alcohol Use   Prescreen: >3 drinks/day or >7 drinks/week? No       Last PSA: No results found for: PSA    Reviewed orders with patient. Reviewed health maintenance and updated orders accordingly - Yes  Lab work is in process    Reviewed and updated as needed this visit by clinical staff   Tobacco  Allergies  Meds              Reviewed and updated as needed this visit by Provider                     Review of Systems   Constitutional:  Negative for chills and fever.   HENT:  Negative for congestion, ear pain, hearing loss and sore throat.    Eyes:  Negative for pain and visual disturbance.   Respiratory:  Negative for cough and shortness of breath.    Cardiovascular:  Negative for  "chest pain, palpitations and peripheral edema.   Gastrointestinal:  Negative for abdominal pain, constipation, diarrhea, heartburn, hematochezia and nausea.   Genitourinary:  Negative for dysuria, frequency, genital sores, hematuria, impotence, penile discharge and urgency.   Musculoskeletal:  Positive for arthralgias and joint swelling. Negative for myalgias.   Skin:  Negative for rash.   Neurological:  Negative for dizziness, weakness, headaches and paresthesias.   Psychiatric/Behavioral:  Negative for mood changes.          OBJECTIVE:   /86 (BP Location: Left arm, Patient Position: Sitting, Cuff Size: Adult Large)   Pulse 69   Temp 98.2  F (36.8  C) (Oral)   Resp 17   Ht 1.805 m (5' 11.06\")   Wt 112.5 kg (248 lb)   SpO2 99%   BMI 34.53 kg/m      Physical Exam  GENERAL: healthy, alert and no distress  NECK: no adenopathy, no asymmetry, masses, or scars and thyroid normal to palpation  RESP: lungs clear to auscultation - no rales, rhonchi or wheezes  CV: regular rate and rhythm, normal S1 S2, no S3 or S4, no murmur, click or rub, no peripheral edema and peripheral pulses strong  ABDOMEN: soft, nontender, no hepatosplenomegaly, no masses and bowel sounds normal  MS: no gross musculoskeletal defects noted, no edema    Diagnostic Test Results:  Labs reviewed in Epic    ASSESSMENT/PLAN:   (Z00.00) Routine general medical examination at a health care facility  (primary encounter diagnosis)  -Vitals stable, depression screening normal.  Plan: Lipid panel reflex to direct LDL Fasting,         Albumin Random Urine Quantitative with Creat         Ratio, Hemoglobin A1c          (I10) Primary hypertension  -Blood pressure is normal on lisinopril 10 mg.    (Z23) Encounter for immunization  - preferred to check immune status to hepatitis B.  Plan: Hepatitis B Surface Antibody            Patient has been advised of split billing requirements and indicates understanding: Yes      COUNSELING:   Reviewed " "preventive health counseling, as reflected in patient instructions       Regular exercise       Healthy diet/nutrition       Alcohol Use        Colorectal cancer screening       Prostate cancer screening      BMI:   Estimated body mass index is 34.53 kg/m  as calculated from the following:    Height as of this encounter: 1.805 m (5' 11.06\").    Weight as of this encounter: 112.5 kg (248 lb).   Weight management plan: Discussed healthy diet and exercise guidelines      He reports that he quit smoking about 11 years ago. His smoking use included cigarettes. He started smoking about 23 years ago. He has a 15.00 pack-year smoking history. He has never used smokeless tobacco.            Jasmina Chowdhury MD  [facility]  "

## 2023-08-01 ENCOUNTER — LAB (OUTPATIENT)
Dept: LAB | Facility: CLINIC | Age: 43
End: 2023-08-01
Payer: COMMERCIAL

## 2023-08-01 DIAGNOSIS — Z11.59 NEED FOR HEPATITIS C SCREENING TEST: ICD-10-CM

## 2023-08-01 DIAGNOSIS — Z23 ENCOUNTER FOR IMMUNIZATION: ICD-10-CM

## 2023-08-01 DIAGNOSIS — Z00.00 ROUTINE GENERAL MEDICAL EXAMINATION AT A HEALTH CARE FACILITY: ICD-10-CM

## 2023-08-01 DIAGNOSIS — Z11.4 SCREENING FOR HIV (HUMAN IMMUNODEFICIENCY VIRUS): Primary | ICD-10-CM

## 2023-08-01 LAB
CHOLEST SERPL-MCNC: 176 MG/DL
CREAT UR-MCNC: 75.9 MG/DL
HBA1C MFR BLD: 5.7 % (ref 0–5.6)
HBV SURFACE AB SERPL IA-ACNC: 459.22 M[IU]/ML
HBV SURFACE AB SERPL IA-ACNC: REACTIVE M[IU]/ML
HCV AB SERPL QL IA: NONREACTIVE
HDLC SERPL-MCNC: 31 MG/DL
HIV 1+2 AB+HIV1 P24 AG SERPL QL IA: NONREACTIVE
LDLC SERPL CALC-MCNC: 98 MG/DL
MICROALBUMIN UR-MCNC: <12 MG/L
MICROALBUMIN/CREAT UR: NORMAL MG/G{CREAT}
NONHDLC SERPL-MCNC: 145 MG/DL
TRIGL SERPL-MCNC: 234 MG/DL

## 2023-08-01 PROCEDURE — 83036 HEMOGLOBIN GLYCOSYLATED A1C: CPT

## 2023-08-01 PROCEDURE — 86706 HEP B SURFACE ANTIBODY: CPT

## 2023-08-01 PROCEDURE — 80061 LIPID PANEL: CPT

## 2023-08-01 PROCEDURE — 87389 HIV-1 AG W/HIV-1&-2 AB AG IA: CPT

## 2023-08-01 PROCEDURE — 82570 ASSAY OF URINE CREATININE: CPT

## 2023-08-01 PROCEDURE — 82043 UR ALBUMIN QUANTITATIVE: CPT

## 2023-08-01 PROCEDURE — 86803 HEPATITIS C AB TEST: CPT

## 2023-08-01 PROCEDURE — 36415 COLL VENOUS BLD VENIPUNCTURE: CPT

## 2023-08-03 NOTE — RESULT ENCOUNTER NOTE
Mr. Ortega,    Your hepatitis C and HIV tests, that are recommended by the CDC, are normal.    Please contact the clinic if you have additional questions.  Thank you.    Sincerely,    Sri Singer MD

## 2023-09-11 DIAGNOSIS — I10 HYPERTENSION GOAL BP (BLOOD PRESSURE) < 140/90: ICD-10-CM

## 2023-09-11 RX ORDER — LISINOPRIL 10 MG/1
10 TABLET ORAL DAILY
Qty: 90 TABLET | Refills: 2 | Status: SHIPPED | OUTPATIENT
Start: 2023-09-11 | End: 2024-06-04

## 2023-10-16 ENCOUNTER — OFFICE VISIT (OUTPATIENT)
Dept: URGENT CARE | Facility: URGENT CARE | Age: 43
End: 2023-10-16
Payer: COMMERCIAL

## 2023-10-16 VITALS
HEART RATE: 61 BPM | BODY MASS INDEX: 35.5 KG/M2 | TEMPERATURE: 97.6 F | OXYGEN SATURATION: 96 % | SYSTOLIC BLOOD PRESSURE: 144 MMHG | RESPIRATION RATE: 16 BRPM | DIASTOLIC BLOOD PRESSURE: 89 MMHG | WEIGHT: 255 LBS

## 2023-10-16 DIAGNOSIS — J02.9 SORETHROAT: Primary | ICD-10-CM

## 2023-10-16 DIAGNOSIS — J02.0 STREP THROAT: ICD-10-CM

## 2023-10-16 LAB
DEPRECATED S PYO AG THROAT QL EIA: NEGATIVE
GROUP A STREP BY PCR: DETECTED

## 2023-10-16 PROCEDURE — 87651 STREP A DNA AMP PROBE: CPT

## 2023-10-16 PROCEDURE — 99213 OFFICE O/P EST LOW 20 MIN: CPT

## 2023-10-16 NOTE — PATIENT INSTRUCTIONS
Strep test is negative for strep throat.  Get plenty of rest and drink fluids.  Can use Tylenol and/or ibuprofen as needed for pain.  Maximum dose of Tylenol is 4000mg in a 24 hour period of time.  Take ibuprofen with food to avoid stomach upset.  You can also try warm salt water gargles, hot/warm water or tea with honey and/or lemon and/or Cepacol lozenges or spray for your sore throat.

## 2023-10-16 NOTE — PROGRESS NOTES
ASSESSMENT:   (J02.9) Sorethroat  (primary encounter diagnosis)  Plan: Streptococcus A Rapid Screen w/Reflex to PCR -         Clinic Collect    PLAN:  Informed the patient that the strep test is negative for strep throat.  We discussed the need to get plenty of rest, drink fluids and use Tylenol and or ibuprofen as needed for pain with a maximum dose of Tylenol being 4000 mg in a 24-hour period of time and to take ibuprofen with food to avoid upset stomach.  We also discussed trying warm salt water gargles, hot/warm water or tea with honey and/or lemon and/or Cepacol lozenges or spray for the sore throat.  Discussed the need to return to clinic with any new or worsening symptoms.  Patient acknowledged their understanding of the above plan.    The use of Dragon/ExactCostation services may have been used to construct the content in this note; any grammatical or spelling errors are non-intentional. Please contact the author of this note directly if you are in need of any clarification.      Lewis Escoto, APRN CNP      SUBJECTIVE:   Tony Ortega  is a 43 year old male who is here today because of: Sore Throat.  The patient has had symptoms of fatigue.   Onset of symptoms was 1 day ago. Course of illness is same.  Patient admits to exposure to strep at home.   Patient denies vomiting and diarrhea  Treatment measures tried include none.    Patient did not do an at home COVID test.     ROS:  Negative except noted above.      OBJECTIVE:   BP (!) 144/89   Pulse 61   Temp 97.6  F (36.4  C) (Tympanic)   Resp 16   Wt 115.7 kg (255 lb)   SpO2 96%   BMI 35.50 kg/m    General: healthy, alert and no distress  Eyes - conjunctivae clear.  Ears - External ears normal. Canals clear. TM's normal.  Nose/Sinuses - Nares normal.Mucosa normal. No drainage or sinus tenderness.  Oropharynx - Lips, mucosa, oropharynx and tongue normal.  Neck - Neck supple; no lymphadenopathy  Lungs - Lungs clear; no wheezing or  rales.  Heart - regular rate and rhythm. No murmurs, rub.    Labs:  Rapid Strep test is negative; await throat culture results.

## 2023-10-17 RX ORDER — AMOXICILLIN 500 MG/1
500 CAPSULE ORAL 2 TIMES DAILY
Qty: 20 CAPSULE | Refills: 0 | Status: SHIPPED | OUTPATIENT
Start: 2023-10-17 | End: 2023-10-27

## 2023-10-27 ENCOUNTER — E-VISIT (OUTPATIENT)
Dept: URGENT CARE | Facility: CLINIC | Age: 43
End: 2023-10-27
Payer: COMMERCIAL

## 2023-10-27 DIAGNOSIS — T36.0X5A AMOXICILLIN RASH: Primary | ICD-10-CM

## 2023-10-27 DIAGNOSIS — L27.0 AMOXICILLIN RASH: Primary | ICD-10-CM

## 2023-10-27 PROCEDURE — 99421 OL DIG E/M SVC 5-10 MIN: CPT | Performed by: PHYSICIAN ASSISTANT

## 2023-10-27 RX ORDER — TRIAMCINOLONE ACETONIDE 1 MG/G
CREAM TOPICAL
Qty: 80 G | Refills: 0 | Status: SHIPPED | OUTPATIENT
Start: 2023-10-27 | End: 2023-11-10

## 2023-10-27 NOTE — PATIENT INSTRUCTIONS
Dear Tony Ortega,    Your photo and description are consistent with an allergic reaction to the amoxicillin you're taking. I recommend stopping this. It looks like today is your last day, no replacement antibiotic is needed.    Take Zyrtec (10mg) in the morning and Benadryl (50mg) in the evening to help the rash clear. I also sent in a prescription for a steroid cream to help calm the itching and help the rash clear. If you develop any shortness of breath, swelling of your lips or tongue or difficulty breathing, call 911 or go to the emergency room right away. If your symptoms worsen or are not improved in 1 week, be seen in clinic for further evaluation.    Feel better soon!     Thank you for choosing us as a partner in your care,    Sayda Vang PA-C  [facility]

## 2023-10-30 ENCOUNTER — E-VISIT (OUTPATIENT)
Dept: URGENT CARE | Facility: CLINIC | Age: 43
End: 2023-10-30
Payer: COMMERCIAL

## 2023-10-30 DIAGNOSIS — R50.9 FEVER, UNSPECIFIED FEVER CAUSE: Primary | ICD-10-CM

## 2023-10-30 PROCEDURE — 99207 PR NON-BILLABLE SERV PER CHARTING: CPT | Performed by: NURSE PRACTITIONER

## 2023-10-30 NOTE — PATIENT INSTRUCTIONS
Dear Tony Ortega,    We are sorry you are not feeling well. Based on the responses you provided, it is recommended that you be seen in-person in urgent care so we can better evaluate your symptoms. Please click here to find the nearest urgent care location to you.   You will not be charged for this Visit. Thank you for trusting us with your care.    Jim Worthington NP

## 2023-10-31 ENCOUNTER — OFFICE VISIT (OUTPATIENT)
Dept: URGENT CARE | Facility: URGENT CARE | Age: 43
End: 2023-10-31
Payer: COMMERCIAL

## 2023-10-31 ENCOUNTER — ANCILLARY PROCEDURE (OUTPATIENT)
Dept: GENERAL RADIOLOGY | Facility: CLINIC | Age: 43
End: 2023-10-31
Attending: PHYSICIAN ASSISTANT
Payer: COMMERCIAL

## 2023-10-31 VITALS
OXYGEN SATURATION: 97 % | WEIGHT: 256.1 LBS | BODY MASS INDEX: 35.66 KG/M2 | TEMPERATURE: 101.6 F | RESPIRATION RATE: 18 BRPM | DIASTOLIC BLOOD PRESSURE: 96 MMHG | SYSTOLIC BLOOD PRESSURE: 160 MMHG | HEART RATE: 95 BPM

## 2023-10-31 DIAGNOSIS — J18.9 PNEUMONIA OF LEFT LOWER LOBE DUE TO INFECTIOUS ORGANISM: Primary | ICD-10-CM

## 2023-10-31 DIAGNOSIS — R50.9 FEVER, UNSPECIFIED FEVER CAUSE: ICD-10-CM

## 2023-10-31 DIAGNOSIS — R05.1 ACUTE COUGH: ICD-10-CM

## 2023-10-31 DIAGNOSIS — R52 BODY ACHES: ICD-10-CM

## 2023-10-31 LAB
BASOPHILS # BLD AUTO: 0 10E3/UL (ref 0–0.2)
BASOPHILS NFR BLD AUTO: 0 %
DEPRECATED S PYO AG THROAT QL EIA: NEGATIVE
EOSINOPHIL # BLD AUTO: 0 10E3/UL (ref 0–0.7)
EOSINOPHIL NFR BLD AUTO: 0 %
ERYTHROCYTE [DISTWIDTH] IN BLOOD BY AUTOMATED COUNT: 12.8 % (ref 10–15)
FLUAV AG SPEC QL IA: NEGATIVE
FLUBV AG SPEC QL IA: NEGATIVE
GROUP A STREP BY PCR: NOT DETECTED
HCT VFR BLD AUTO: 41.9 % (ref 40–53)
HGB BLD-MCNC: 13.8 G/DL (ref 13.3–17.7)
IMM GRANULOCYTES # BLD: 0 10E3/UL
IMM GRANULOCYTES NFR BLD: 0 %
LYMPHOCYTES # BLD AUTO: 0.9 10E3/UL (ref 0.8–5.3)
LYMPHOCYTES NFR BLD AUTO: 8 %
MCH RBC QN AUTO: 29.1 PG (ref 26.5–33)
MCHC RBC AUTO-ENTMCNC: 32.9 G/DL (ref 31.5–36.5)
MCV RBC AUTO: 88 FL (ref 78–100)
MONOCYTES # BLD AUTO: 1 10E3/UL (ref 0–1.3)
MONOCYTES NFR BLD AUTO: 9 %
NEUTROPHILS # BLD AUTO: 9.2 10E3/UL (ref 1.6–8.3)
NEUTROPHILS NFR BLD AUTO: 82 %
PLATELET # BLD AUTO: 161 10E3/UL (ref 150–450)
RBC # BLD AUTO: 4.75 10E6/UL (ref 4.4–5.9)
WBC # BLD AUTO: 11.2 10E3/UL (ref 4–11)

## 2023-10-31 PROCEDURE — 87635 SARS-COV-2 COVID-19 AMP PRB: CPT | Performed by: PHYSICIAN ASSISTANT

## 2023-10-31 PROCEDURE — 99214 OFFICE O/P EST MOD 30 MIN: CPT | Performed by: PHYSICIAN ASSISTANT

## 2023-10-31 PROCEDURE — 85025 COMPLETE CBC W/AUTO DIFF WBC: CPT | Performed by: PHYSICIAN ASSISTANT

## 2023-10-31 PROCEDURE — 36415 COLL VENOUS BLD VENIPUNCTURE: CPT | Performed by: PHYSICIAN ASSISTANT

## 2023-10-31 PROCEDURE — 87804 INFLUENZA ASSAY W/OPTIC: CPT | Performed by: PHYSICIAN ASSISTANT

## 2023-10-31 PROCEDURE — 71046 X-RAY EXAM CHEST 2 VIEWS: CPT | Mod: TC | Performed by: RADIOLOGY

## 2023-10-31 PROCEDURE — 87651 STREP A DNA AMP PROBE: CPT | Performed by: PHYSICIAN ASSISTANT

## 2023-10-31 RX ORDER — AZITHROMYCIN 250 MG/1
TABLET, FILM COATED ORAL
Qty: 6 TABLET | Refills: 0 | Status: SHIPPED | OUTPATIENT
Start: 2023-10-31 | End: 2024-02-06

## 2023-10-31 RX ORDER — CEFDINIR 300 MG/1
300 CAPSULE ORAL 2 TIMES DAILY
Qty: 20 CAPSULE | Refills: 0 | Status: SHIPPED | OUTPATIENT
Start: 2023-10-31 | End: 2023-11-10

## 2023-10-31 ASSESSMENT — PAIN SCALES - GENERAL: PAINLEVEL: NO PAIN (0)

## 2023-10-31 NOTE — PROGRESS NOTES
Chief Complaint   Patient presents with    Cough     Cough beginning yesterday; cough is productive; green phlegm. Patient had Strep two weeks ago; patient was treated with amoxicillin but developed a rash on day 10 and quit taking medication (patient took all but one dose of amoxicillin).     Fever     Fever beginning yesterday. Tylenol last at 0900 this morning. Temperature highest of 102.    Generalized Body Aches     Patient reports body aches and feeling exhausted.      Results for orders placed or performed in visit on 10/31/23   XR Chest 2 Views     Status: None    Narrative    XR CHEST 2 VIEWS 10/31/2023 3:15 PM    HISTORY: Fever, unspecified fever cause; Acute cough    COMPARISON: None.      Impression    IMPRESSION: Heart is normal in size. Small consolidation at the left  lung base posteriorly concerning for pneumonia. Right lung clear. No  effusion.    SITA BANEGAS MD         SYSTEM ID:  [id number]   Results for orders placed or performed in visit on 10/31/23   CBC with platelets and differential     Status: Abnormal   Result Value Ref Range    WBC Count 11.2 (H) 4.0 - 11.0 10e3/uL    RBC Count 4.75 4.40 - 5.90 10e6/uL    Hemoglobin 13.8 13.3 - 17.7 g/dL    Hematocrit 41.9 40.0 - 53.0 %    MCV 88 78 - 100 fL    MCH 29.1 26.5 - 33.0 pg    MCHC 32.9 31.5 - 36.5 g/dL    RDW 12.8 10.0 - 15.0 %    Platelet Count 161 150 - 450 10e3/uL    % Neutrophils 82 %    % Lymphocytes 8 %    % Monocytes 9 %    % Eosinophils 0 %    % Basophils 0 %    % Immature Granulocytes 0 %    Absolute Neutrophils 9.2 (H) 1.6 - 8.3 10e3/uL    Absolute Lymphocytes 0.9 0.8 - 5.3 10e3/uL    Absolute Monocytes 1.0 0.0 - 1.3 10e3/uL    Absolute Eosinophils 0.0 0.0 - 0.7 10e3/uL    Absolute Basophils 0.0 0.0 - 0.2 10e3/uL    Absolute Immature Granulocytes 0.0 <=0.4 10e3/uL   Streptococcus A Rapid Screen w/Reflex to PCR - Clinic Collect     Status: Normal    Specimen: Throat; Swab   Result Value Ref Range    Group A Strep antigen Negative  Negative   Influenza A & B Antigen - Clinic Collect     Status: Normal    Specimen: Nose; Swab   Result Value Ref Range    Influenza A antigen Negative Negative    Influenza B antigen Negative Negative    Narrative    Test results must be correlated with clinical data. If necessary, results should be confirmed by a molecular assay or viral culture.   CBC with platelets and differential     Status: Abnormal    Narrative    The following orders were created for panel order CBC with platelets and differential.  Procedure                               Abnormality         Status                     ---------                               -----------         ------                     CBC with platelets and d...[[id number]]  Abnormal            Final result                 Please view results for these tests on the individual orders.     Xray-  See opacity left lower lung        ASSESSMENT:     ICD-10-CM    1. Pneumonia of left lower lobe due to infectious organism  J18.9 azithromycin (ZITHROMAX Z-REED) 250 MG tablet     cefdinir (OMNICEF) 300 MG capsule      2. Fever, unspecified fever cause  R50.9 Streptococcus A Rapid Screen w/Reflex to PCR - Clinic Collect     Influenza A & B Antigen - Clinic Collect     Symptomatic COVID-19 Virus (Coronavirus) by PCR Nose     Group A Streptococcus PCR Throat Swab     XR Chest 2 Views     CBC with platelets and differential     CBC with platelets and differential     azithromycin (ZITHROMAX Z-REED) 250 MG tablet     cefdinir (OMNICEF) 300 MG capsule      3. Acute cough  R05.1 XR Chest 2 Views     CBC with platelets and differential     CBC with platelets and differential     azithromycin (ZITHROMAX Z-REED) 250 MG tablet     cefdinir (OMNICEF) 300 MG capsule      4. Body aches  R52 azithromycin (ZITHROMAX Z-REED) 250 MG tablet     cefdinir (OMNICEF) 300 MG capsule            PLAN: Acute onset of cough, fever body aches yesterday.  X-ray, likely pneumonia.  Will treat with cefdinir and  Kamlesh.  Recheck 5 days if not better.  Otherwise repeat x-ray in 4 to 6 weeks with primary to make sure it cleared.  Did have recent strep with onset of rash at day 10 of amoxicillin.  Finished all but 1 dose of the amoxicillin 5 days ago and the rash was gone by the next day.  COVID test and further strep test pending.  I have discussed clinical findings with patient.  Side effects of medications discussed.  Symptomatic care is discussed.  I have discussed the possibility of  worsening symptoms and indication to RTC or go to the ER if they occur.  All questions are answered, patient indicates understanding of these issues and is in agreement with plan.   Patient care instructions are discussed/given at the end of visit.   Lots of rest and fluids.      Lis Conde PA-C      SUBJECTIVE:  43-year-old male presents for acute onset of fever and body aches yesterday.  Some cough.  No sore throat.  Had recent strep however and was treated with amoxicillin.  Finished all amoxicillin but 1 dose as he developed rash 5 days ago.  The next day the rash was gone..  No vomiting or diarrhea.  Negative home COVID test.  No rash. No ST. Temp up to 102 last night      Allergies   Allergen Reactions    Amoxicillin Rash       Past Medical History:   Diagnosis Date    Hypertension goal BP (blood pressure) < 140/90 7/15/2020    Overweight 2015     Problem list name updated by automated process. Provider to review       lisinopril (ZESTRIL) 10 MG tablet, Take 1 tablet (10 mg) by mouth daily  triamcinolone (KENALOG) 0.1 % external cream, Apply to affected area twice daily for up to 14 days.    No current facility-administered medications on file prior to visit.      Social History     Tobacco Use    Smoking status: Former     Packs/day: 1.50     Years: 10.00     Additional pack years: 0.00     Total pack years: 15.00     Types: Cigarettes     Start date: 2000     Quit date: 3/9/2012     Years since quittin.6      Passive exposure: Past    Smokeless tobacco: Never   Substance Use Topics    Alcohol use: Yes     Comment: Occasionally       ROS:  CONSTITUTIONAL: Negative for fatigue EYES: Negative for eye problems.  ENT: As above.  RESP: As above.  CV: Negative for chest pains.  GI: Negative for vomiting.  MUSCULOSKELETAL:  Negative for significant muscle or joint pains.  NEUROLOGIC: Negative for headaches.  SKIN: Negative for rash.  PSYCH: Normal mentation for age.    OBJECTIVE:  BP (!) 160/96 (BP Location: Left arm, Patient Position: Sitting, Cuff Size: Adult Large)   Pulse 95   Temp (!) 101.6  F (38.7  C) (Tympanic)   Resp 18   Wt 116.2 kg (256 lb 1.6 oz)   SpO2 97%   BMI 35.66 kg/m    GENERAL APPEARANCE: Healthy, alert and no distress.  EYES:Conjunctiva/sclera clear.  EARS: No cerumen.   Ear canals w/o erythema.  TM's intact w/o erythema.    NOSE/MOUTH: Nose without ulcers, erythema or lesions.  SINUSES: No maxillary sinus tenderness.  THROAT: No erythema w/o tonsillar enlargement . No exudates.  NECK: Supple, nontender, no lymphadenopathy.  RESP: Lungs clear to auscultation - no rales, rhonchi or wheezes  CV: Regular rate and rhythm, normal S1 S2, no murmur noted.  NEURO: Awake, alert    SKIN: No rashes        Lis Conde PA-C

## 2023-11-01 LAB — SARS-COV-2 RNA RESP QL NAA+PROBE: NEGATIVE

## 2023-11-02 ENCOUNTER — MYC MEDICAL ADVICE (OUTPATIENT)
Dept: FAMILY MEDICINE | Facility: CLINIC | Age: 43
End: 2023-11-02
Payer: COMMERCIAL

## 2024-02-06 ENCOUNTER — OFFICE VISIT (OUTPATIENT)
Dept: OPTOMETRY | Facility: CLINIC | Age: 44
End: 2024-02-06
Payer: COMMERCIAL

## 2024-02-06 DIAGNOSIS — H52.12 MYOPIA OF LEFT EYE: ICD-10-CM

## 2024-02-06 DIAGNOSIS — Z98.890 HX OF LASIK: Primary | ICD-10-CM

## 2024-02-06 DIAGNOSIS — H52.4 PRESBYOPIA: ICD-10-CM

## 2024-02-06 PROCEDURE — 92004 COMPRE OPH EXAM NEW PT 1/>: CPT | Performed by: OPTOMETRIST

## 2024-02-06 PROCEDURE — 92015 DETERMINE REFRACTIVE STATE: CPT | Performed by: OPTOMETRIST

## 2024-02-06 ASSESSMENT — TONOMETRY
IOP_METHOD: TONOPEN
OS_IOP_MMHG: 19
OD_IOP_MMHG: 19

## 2024-02-06 ASSESSMENT — KERATOMETRY
OS_AXISANGLE_DEGREES: 070
OS_K1POWER_DIOPTERS: 37.50
OD_K2POWER_DIOPTERS: 37.75
OD_AXISANGLE_DEGREES: 112
OS_K2POWER_DIOPTERS: 38.00
OD_AXISANGLE2_DEGREES: 022
OD_K1POWER_DIOPTERS: 37.25
OS_AXISANGLE2_DEGREES: 160

## 2024-02-06 ASSESSMENT — VISUAL ACUITY
OD_SC: 20/20
OS_SC: 20/20
OS_SC: 20/20
METHOD: SNELLEN - LINEAR
OD_SC: 20/30

## 2024-02-06 ASSESSMENT — REFRACTION_MANIFEST
OS_SPHERE: -0.50
OS_ADD: +1.00
METHOD_AUTOREFRACTION: 1
OS_CYLINDER: SPHERE
OD_SPHERE: PLANO
OD_ADD: +1.00

## 2024-02-06 ASSESSMENT — SLIT LAMP EXAM - LIDS
COMMENTS: NORMAL
COMMENTS: NORMAL

## 2024-02-06 ASSESSMENT — CUP TO DISC RATIO
OS_RATIO: 0.35
OD_RATIO: 0.35

## 2024-02-06 ASSESSMENT — CONF VISUAL FIELD
OD_INFERIOR_NASAL_RESTRICTION: 0
OS_INFERIOR_NASAL_RESTRICTION: 0
OS_INFERIOR_TEMPORAL_RESTRICTION: 0
OS_SUPERIOR_TEMPORAL_RESTRICTION: 0
OD_INFERIOR_TEMPORAL_RESTRICTION: 0
OD_SUPERIOR_TEMPORAL_RESTRICTION: 0
OD_NORMAL: 1
OS_NORMAL: 1
OD_SUPERIOR_NASAL_RESTRICTION: 0
OS_SUPERIOR_NASAL_RESTRICTION: 0

## 2024-02-06 ASSESSMENT — EXTERNAL EXAM - LEFT EYE: OS_EXAM: BROW PTOSIS

## 2024-02-06 ASSESSMENT — EXTERNAL EXAM - RIGHT EYE: OD_EXAM: BROW PTOSIS

## 2024-02-06 NOTE — PROGRESS NOTES
Chief Complaint   Patient presents with    Annual Eye Exam         Last Eye Exam: 11 years  Dilated Previously: Yes    What are you currently using to see?  does not use glasses or contacts       Distance Vision Acuity: Noticed gradual change in both eyes    Near Vision Acuity: Not satisfied     Eye Comfort: good  Do you use eye drops? 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    Sue Rucker Optometric Assistant, A.B.O.C.      Medical, surgical and family histories reviewed and updated 2/6/2024.       OBJECTIVE: See Ophthalmology exam    ASSESSMENT:    ICD-10-CM    1. Hx of LASIK  Z98.890 EYE EXAM (SIMPLE-NONBILLABLE)      2. Myopia of left eye  H52.12 REFRACTION      3. Presbyopia  H52.4 REFRACTION          PLAN:     Patient Instructions   Eyeglass prescription given.  Optional glasses as needed for near vision.    Return in 1 year for a complete eye exam or sooner if needed.    Last Swain, OD

## 2024-02-06 NOTE — LETTER
2/6/2024         RE: Tony Ortega  [address] [address]        Dear Colleague,    Thank you for referring your patient, Tony Ortega, to the [facility]. Please see a copy of my visit note below.    Chief Complaint   Patient presents with     Annual Eye Exam         Last Eye Exam: 11 years  Dilated Previously: Yes    What are you currently using to see?  does not use glasses or contacts       Distance Vision Acuity: Noticed gradual change in both eyes    Near Vision Acuity: Not satisfied     Eye Comfort: good  Do you use eye drops? 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      Medical, surgical and family histories reviewed and updated 2/6/2024.       OBJECTIVE: See Ophthalmology exam    ASSESSMENT:    ICD-10-CM    1. Hx of LASIK  Z98.890 EYE EXAM (SIMPLE-NONBILLABLE)      2. Myopia of left eye  H52.12 REFRACTION      3. Presbyopia  H52.4 REFRACTION          PLAN:     Patient Instructions   Eyeglass prescription given.  Optional glasses as needed for near vision.    Return in 1 year for a complete eye exam or sooner if needed.    Last Swain, TOBY         Again, thank you for allowing me to participate in the care of your patient.        Sincerely,        Last Swain, OD

## 2024-02-06 NOTE — PATIENT INSTRUCTIONS
Eyeglass prescription given.  Optional glasses as needed for near vision.    Return in 1 year for a complete eye exam or sooner if needed.    Last Swain, OD    The affects of the dilating drops last for 4- 6 hours.  You will be more sensitive to light and vision will be blurry up close.  Do not drive if you do not feel comfortable.  Mydriatic sunglasses were given if needed.      Optometry Providers       Clinic Locations                                 Telephone Number   Dr. Yenni Elliott    [place]   [facility]  [place] [phone]     [facility] Hours:                [place] [facility] Hours:       [place] [facility] Hours:   [address]   [address] [facility]     [address] [place] [address]   [place] [address]    [place] [address]  Phone: [phone]                    Phone: [phone]     Phone: [phone]                      Monday 8:00-6:00                          Monday 8:00-6:00                          Monday 8:00-6:00              Tuesday 8:00-6:00                          Tuesday 8:00-6:00                          Tuesday 8:00-6:00              Wednesday 8:00-6:00                  Wednesday 8:00-6:00                   Wednesday 8:00-6:00      Thursday 8:00-6:00                        Thursday 8:00-6:00                         Thursday 8:00-6:00            Friday 8:00-5:00                              Friday 8:00-5:00                              Friday 8:00-5:00    [facility] Hours:   [address] [place], [place] [address] [phone]    Monday 9:00-6:00  Tuesday 9:00-6:00  Wednesday 9:00-6:00  Thursday 9:00-6:00  Friday 9:00-5:00  As always, Thank you for trusting us with your health care needs!  There is a combination of three treatments which can greatly improve symptoms of dry eyes.     Artificial tears  Heat  (eyes closed)  Eyelid and eyelash cleansing (eyes closed)     Use one drop of artificial tears both eyes 4 x daily.  Once in the morning, lunch, dinner and bedtime. Continue to use the drops regardless if your eyes are comfortable or not.  Artificial tears work best as a preventative and not as well after your eyes are starting to bother you.  It may take 4- 6 weeks of using the drops before you notice improvement.  If after that time you are still having problems schedule an appointment for an evaluation and discussion of different treatments such as Restasis or Xiidra.  Dry eyes are a chronic condition and you may have more symptoms at certain times of the year.    Excess tearing can be due to the right tears not working properly or a blockage in the tear drainage system.  You can try using artificial tears 1 drop both eyes 4 x day.  If the excess tearing is bothersome after 4-6 weeks of treatment then we can send you for further testing.  This would entail a referral to our oculoplastic specialist Dr. Larisa Negron at the [facility]-[phone].    Recommended brands are:    Systane Complete  Systane Ultra  Systane Balance  Refresh Advanced Optive  Refresh Relieva  Blink    Recommended brands for contact lens wearers are:    Systane contacts  Refresh contacts  Blink contacts    If you are using drops more than 4 x day or have sensitivities to preservatives I recommend non preserved artificial tears.  These come in 1 use vials.  They can be used every 1-2 hours.  Do not reuse the vials.    Recommended brands are:    Refresh Optive Evan-3  Systane- preservative free  Refresh-  preservative free  Blink- preservative free    Gels or ointment can be used at night.    Recommended brands are:    Systane Gel  Refresh Gel  Blink Gel  Genteal Gel    Systane night time (ointment)  Refresh Celluvisc  Refresh PM (ointment)    Optase dry eye spray.  Spray to eyelids 3-4 x daily.  This can be purchased on  Amazon.      Visine, Clear Eyes or Murine (drops that get the red out) can irritate the eyes and cause a rebound effect where the eyes become more red and you end up using more drops.  Avoid drops containing tetrahydrozoline, naphazoline, phenylephrine, oxymetazoline.      OTC Lumify is a newer product that gives immediate redness relief without the rebound effect.  Use as needed to take the redness out.    Artificial tears may be used with other drops (such as allergy, glaucoma, antibiotics) around the same time.  Be sure to wait 5 minutes in between drops.    Heat to the eyelids can also improve your symptoms of dry eyes.  Shadi heat masks can be purchased at Amazon to be used nightly for 10-15 minutes.  Other options are gel masks that can be put in the microwave and purchased at most pharmacies.      Tea Tree Oil eyelid cleansers recommended are Ocusoft Oust foam cleanser to cleanse eyelids/lashes at night and in the am. Other options are Blephadex or Cliradex eyelid wipes.  KEEP EYES CLOSED when using these products.  These can be purchased on amazon.com   A good product for make up remover with tea tree oil is WeLoveEyes.  This can be found at www.Medication Review or my4oneone.    Other good eyelid cleansers have hypochlorous which removes excess bacteria and is safe around the eyes. Products are Avenova, Ocusoft Hypochlor or Heyedrate. Spray solution onto cotton pad, close eyes and gently apply to eyelids and eyelashes using side to side motion.  You can also KEEP EYES CLOSED spray and rub into eyelashes.  You do not need to rinse it off. Use morning and evening. These products can be found on Amazon.  You can check with your local pharmacy and see if they can order if for you if they don't have it.    Other brands of eyelid cleansing wipes are:    Ocusoft wipes  Systane wipes    A great eye make up line is [email or web address]

## 2024-02-29 ENCOUNTER — MYC MEDICAL ADVICE (OUTPATIENT)
Dept: UROLOGY | Facility: CLINIC | Age: 44
End: 2024-02-29
Payer: COMMERCIAL

## 2024-03-21 ENCOUNTER — OFFICE VISIT (OUTPATIENT)
Dept: URGENT CARE | Facility: URGENT CARE | Age: 44
End: 2024-03-21
Payer: COMMERCIAL

## 2024-03-21 ENCOUNTER — ANCILLARY PROCEDURE (OUTPATIENT)
Dept: GENERAL RADIOLOGY | Facility: CLINIC | Age: 44
End: 2024-03-21
Attending: STUDENT IN AN ORGANIZED HEALTH CARE EDUCATION/TRAINING PROGRAM
Payer: COMMERCIAL

## 2024-03-21 VITALS
SYSTOLIC BLOOD PRESSURE: 132 MMHG | TEMPERATURE: 97.9 F | BODY MASS INDEX: 36.56 KG/M2 | DIASTOLIC BLOOD PRESSURE: 84 MMHG | RESPIRATION RATE: 16 BRPM | HEART RATE: 72 BPM | WEIGHT: 262.6 LBS | OXYGEN SATURATION: 98 %

## 2024-03-21 DIAGNOSIS — M79.671 PAIN OF RIGHT HEEL: ICD-10-CM

## 2024-03-21 DIAGNOSIS — M79.671 PAIN OF RIGHT HEEL: Primary | ICD-10-CM

## 2024-03-21 PROCEDURE — 73630 X-RAY EXAM OF FOOT: CPT | Mod: TC | Performed by: RADIOLOGY

## 2024-03-21 PROCEDURE — 99214 OFFICE O/P EST MOD 30 MIN: CPT | Performed by: STUDENT IN AN ORGANIZED HEALTH CARE EDUCATION/TRAINING PROGRAM

## 2024-03-21 RX ORDER — ACETAMINOPHEN 500 MG
500-1000 TABLET ORAL EVERY 6 HOURS PRN
Qty: 60 TABLET | Refills: 0 | Status: SHIPPED | OUTPATIENT
Start: 2024-03-21

## 2024-03-21 RX ORDER — NAPROXEN 500 MG/1
500 TABLET ORAL 2 TIMES DAILY WITH MEALS
Qty: 28 TABLET | Refills: 0 | Status: SHIPPED | OUTPATIENT
Start: 2024-03-21 | End: 2024-04-04

## 2024-03-21 ASSESSMENT — PAIN SCALES - GENERAL: PAINLEVEL: MILD PAIN (2)

## 2024-03-21 NOTE — PATIENT INSTRUCTIONS
Xray shows tiny bone spur but otherwise normal.  Recommend scheduled naproxen. Tylenol as needed.  See attachment for plantar fascia stretches.  May try heel cups for support.  Follow-up with ortho if pain persists.

## 2024-03-21 NOTE — PROGRESS NOTES
ASSESSMENT & PLAN:   Diagnoses and all orders for this visit:  Pain of right heel  -     XR Foot Right G/E 3 Views; Future  -     Orthopedic  Referral; Future  -     naproxen (NAPROSYN) 500 MG tablet; Take 1 tablet (500 mg) by mouth 2 times daily (with meals) for 14 days  -     acetaminophen (TYLENOL) 500 MG tablet; Take 1-2 tablets (500-1,000 mg) by mouth every 6 hours as needed for mild pain    Atraumatic right heel pain x 1 day. On exam has localized tenderness to plantar and posterior aspect of heel. X-ray shows small bone spur. Symptoms suggestive of plantar fasciitis with pain worse after periods of rest. Recommend naproxen, Tylenol as needed, heel cup, stretching. Follow-up with Ortho if symptoms persist.    At the end of the encounter, I discussed results, diagnosis, medications. Discussed red flags for immediate return to clinic/ER, as well as indications for follow up if no improvement. Patient and/or caregiver understood and agreed to plan. Patient was stable for discharge.    Patient Instructions   Xray shows tiny bone spur but otherwise normal.  Recommend scheduled naproxen. Tylenol as needed.  See attachment for plantar fascia stretches.  May try heel cups for support.  Follow-up with ortho if pain persists.    ------------------------------------------------------------------------  SUBJECTIVE  History was obtained from patient.    Patient presents with:  Foot Pain: Pain in right heel (foot). No known injury/trauma. Sx onset- Wednesday     HPI  Tony Ortega is a(n) 44 year old male presenting to urgent care for right heel pain that began yesterday. No injury. Pain worse with weightbearing and after periods of rest.    Review of Systems    Current Outpatient Medications   Medication Sig Dispense Refill    acetaminophen (TYLENOL) 500 MG tablet Take 1-2 tablets (500-1,000 mg) by mouth every 6 hours as needed for mild pain 60 tablet 0    lisinopril (ZESTRIL) 10 MG tablet Take 1 tablet (10 mg)  by mouth daily 90 tablet 2    naproxen (NAPROSYN) 500 MG tablet Take 1 tablet (500 mg) by mouth 2 times daily (with meals) for 14 days 28 tablet 0     Problem List:  2021-11: TMJ (temporomandibular joint syndrome)  2020-08: Morbid obesity (H)  2020-07: Hypertension goal BP (blood pressure) < 140/90  2020-04: Dizziness  2020-04: Meniere's disease, left  2018-06: Neck pain on left side  2018-06: Left shoulder pain  2018-06: Chronic periscapular pain on left side  2018-06: Cervicalgia  2015-09: Lipid screening  2015-09: Obesity, Class I, BMI 30-34.9  2015-09: Screening for diabetes mellitus    Allergies   Allergen Reactions    Amoxicillin Rash         OBJECTIVE  Vitals:    03/21/24 1154   BP: 132/84   BP Location: Left arm   Patient Position: Sitting   Cuff Size: Adult Large   Pulse: 72   Resp: 16   Temp: 97.9  F (36.6  C)   TempSrc: Tympanic   SpO2: 98%   Weight: 119.1 kg (262 lb 9.6 oz)     Physical Exam   GENERAL: healthy, alert, no acute distress.   PSYCH: mentation appears normal. Normal affect  MSK: right LE - no deformity, edema, ecchymosis, erythema. Tender to palpation of heel at plantar and posterior aspect. No other tenderness in foot or ankle. Full ROM dorsiflexion and plantarflexion.    Xrays were preliminarily reviewed by me - small bone spur.     Results for orders placed or performed in visit on 03/21/24   XR Foot Right G/E 3 Views     Status: None    Narrative    FOOT RIGHT THREE OR MORE VIEWS March 21, 2024 12:37 PM     HISTORY: Atraumatic heel pain for one day. Pain of right heel.    COMPARISON: None.      Impression    IMPRESSION: Tiny Achilles enthesophyte. No evidence of fracture.  Otherwise negative.    KAZ HARP MD         SYSTEM ID:  [id number]

## 2024-03-26 ENCOUNTER — OFFICE VISIT (OUTPATIENT)
Dept: PODIATRY | Facility: CLINIC | Age: 44
End: 2024-03-26
Attending: STUDENT IN AN ORGANIZED HEALTH CARE EDUCATION/TRAINING PROGRAM
Payer: COMMERCIAL

## 2024-03-26 DIAGNOSIS — M76.61 ACHILLES TENDINITIS OF RIGHT LOWER EXTREMITY: Primary | ICD-10-CM

## 2024-03-26 DIAGNOSIS — M79.671 PAIN OF RIGHT HEEL: ICD-10-CM

## 2024-03-26 PROCEDURE — 99203 OFFICE O/P NEW LOW 30 MIN: CPT | Performed by: PODIATRIST

## 2024-03-26 ASSESSMENT — PAIN SCALES - GENERAL: PAINLEVEL: MODERATE PAIN (5)

## 2024-03-26 NOTE — NURSING NOTE
Tony Ortega's chief complaint for this visit includes:  Chief Complaint   Patient presents with    Right Foot - Pain, New Patient     Right heel pain since 3/20/2024     PCP: [facility]    Referring Provider:  Geneva Marquez PA-C  [address] [place] [id number]    There were no vitals taken for this visit.  Moderate Pain (5)     Do you need any medication refills at today's visit? NO    Allergies   Allergen Reactions    Amoxicillin Rash       Edmund Kearney, EMT

## 2024-03-26 NOTE — LETTER
3/26/2024         RE: Tony Ortega  [address]  [place] [address]        Dear Colleague,    Thank you for referring your patient, Tony Ortega, to the [facility]. Please see a copy of my visit note below.    Past Medical History:   Diagnosis Date     Hypertension goal BP (blood pressure) < 140/90 7/15/2020     Overweight 2015     Problem list name updated by automated process. Provider to review     Patient Active Problem List   Diagnosis     Lipid screening     Obesity, Class I, BMI 30-34.9     Screening for diabetes mellitus     Cervicalgia     Neck pain on left side     Left shoulder pain     Chronic periscapular pain on left side     Dizziness     Hypertension goal BP (blood pressure) < 140/90     Morbid obesity (H)     Past Surgical History:   Procedure Laterality Date     EYE SURGERY      lasik     Social History     Socioeconomic History     Marital status:      Spouse name: Not on file     Number of children: Not on file     Years of education: Not on file     Highest education level: Not on file   Occupational History     Not on file   Tobacco Use     Smoking status: Former     Packs/day: 1.50     Years: 10.00     Additional pack years: 0.00     Total pack years: 15.00     Types: Cigarettes     Start date: 2000     Quit date: 3/9/2012     Years since quittin.0     Passive exposure: Past     Smokeless tobacco: Never   Vaping Use     Vaping Use: Never used   Substance and Sexual Activity     Alcohol use: Yes     Comment: Occasionally     Drug use: No     Sexual activity: Yes     Partners: Female     Birth control/protection: None   Other Topics Concern     Parent/sibling w/ CABG, MI or angioplasty before 65F 55M? No   Social History Narrative     Not on file     Social Determinants of Health     Financial Resource Strain: Not on file   Food Insecurity: Not on file   Transportation Needs: Not on file   Physical Activity: Not on  file   Stress: Not on file   Social Connections: Not on file   Interpersonal Safety: Not on file   Housing Stability: Not on file     Family History   Problem Relation Age of Onset     Thyroid Disease Father         Doctor thinks it related to a traumatic headinjury     Cancer Maternal Grandfather      Diabetes Maternal Grandfather      Coronary Artery Disease Maternal Grandfather      Hypertension Maternal Grandfather      Colon Cancer Maternal Grandfather      Hypertension Paternal Grandfather      Hyperlipidemia No family hx of      Cerebrovascular Disease No family hx of      Prostate Cancer No family hx of      Depression No family hx of      Anxiety Disorder No family hx of      Thyroid Disease No family hx of      Asthma No family hx of            Subjective findings- 44-year-old presents from urgent care for right posterior heel pain, I reviewed urgent cares 3/21/2024 note.  Relates last Wednesday it started hurting, relates to no injury, but relates he has been biking for exercise and noticed some tightness after doing stationary bike the week before, relates he did adjust his bike and that seems to have helped, relates its gotten better, relates to taking Naproxen and stretching and that is helped as well.    Objective findings- DP and PT are 2 out of 4 right.  Has a functional Hallux Limitus with dorsal medial first MPJ prominence right foot.  has no gross tendon voids, there is no pain on palpation, patient relates the pain is in the posterior heel when it occurs, no erythema, no edema, no drainage, no odor, no calor.  3 views x-rays right foot from 3/21/2024 reviewed with patient in clinic today with joint and cortical margins intact and posterior calcaneal spurring and minimal plantar calcaneal spurring noted.    Assessment and plan- Achilles tendinitis/bursitis right.  Diagnosis and treatment options discussed with the patient.  Patient is advised on stretching and activity modification.  Prescription  for Voltaren gel given use discussed with the patient.  Over-the-counter Spenco or Superfeet orthotics discussed with the patient.  Return to clinic and see me as needed.                    Low level of medical decision making.      Again, thank you for allowing me to participate in the care of your patient.        Sincerely,        Jasbir Connell DPM

## 2024-03-26 NOTE — PROGRESS NOTES
Past Medical History:   Diagnosis Date    Hypertension goal BP (blood pressure) < 140/90 7/15/2020    Overweight 2015     Problem list name updated by automated process. Provider to review     Patient Active Problem List   Diagnosis    Lipid screening    Obesity, Class I, BMI 30-34.9    Screening for diabetes mellitus    Cervicalgia    Neck pain on left side    Left shoulder pain    Chronic periscapular pain on left side    Dizziness    Hypertension goal BP (blood pressure) < 140/90    Morbid obesity (H)     Past Surgical History:   Procedure Laterality Date    EYE SURGERY      lasik     Social History     Socioeconomic History    Marital status:      Spouse name: Not on file    Number of children: Not on file    Years of education: Not on file    Highest education level: Not on file   Occupational History    Not on file   Tobacco Use    Smoking status: Former     Packs/day: 1.50     Years: 10.00     Additional pack years: 0.00     Total pack years: 15.00     Types: Cigarettes     Start date: 2000     Quit date: 3/9/2012     Years since quittin.0     Passive exposure: Past    Smokeless tobacco: Never   Vaping Use    Vaping Use: Never used   Substance and Sexual Activity    Alcohol use: Yes     Comment: Occasionally    Drug use: No    Sexual activity: Yes     Partners: Female     Birth control/protection: None   Other Topics Concern    Parent/sibling w/ CABG, MI or angioplasty before 65F 55M? No   Social History Narrative    Not on file     Social Determinants of Health     Financial Resource Strain: Not on file   Food Insecurity: Not on file   Transportation Needs: Not on file   Physical Activity: Not on file   Stress: Not on file   Social Connections: Not on file   Interpersonal Safety: Not on file   Housing Stability: Not on file     Family History   Problem Relation Age of Onset    Thyroid Disease Father         Doctor thinks it related to a traumatic headinjury    Cancer Maternal  Grandfather     Diabetes Maternal Grandfather     Coronary Artery Disease Maternal Grandfather     Hypertension Maternal Grandfather     Colon Cancer Maternal Grandfather     Hypertension Paternal Grandfather     Hyperlipidemia No family hx of     Cerebrovascular Disease No family hx of     Prostate Cancer No family hx of     Depression No family hx of     Anxiety Disorder No family hx of     Thyroid Disease No family hx of     Asthma No family hx of            Subjective findings- 44-year-old presents from urgent care for right posterior heel pain, I reviewed urgent cares 3/21/2024 note.  Relates last Wednesday it started hurting, relates to no injury, but relates he has been biking for exercise and noticed some tightness after doing stationary bike the week before, relates he did adjust his bike and that seems to have helped, relates its gotten better, relates to taking Naproxen and stretching and that is helped as well.    Objective findings- DP and PT are 2 out of 4 right.  Has a functional Hallux Limitus with dorsal medial first MPJ prominence right foot.  has no gross tendon voids, there is no pain on palpation, patient relates the pain is in the posterior heel when it occurs, no erythema, no edema, no drainage, no odor, no calor.  3 views x-rays right foot from 3/21/2024 reviewed with patient in clinic today with joint and cortical margins intact and posterior calcaneal spurring and minimal plantar calcaneal spurring noted.    Assessment and plan- Achilles tendinitis/bursitis right.  Diagnosis and treatment options discussed with the patient.  Patient is advised on stretching and activity modification.  Prescription for Voltaren gel given use discussed with the patient.  Over-the-counter Spenco or Superfeet orthotics discussed with the patient.  Return to clinic and see me as needed.                    Low level of medical decision making.

## 2024-04-23 ENCOUNTER — VIRTUAL VISIT (OUTPATIENT)
Dept: UROLOGY | Facility: CLINIC | Age: 44
End: 2024-04-23
Payer: COMMERCIAL

## 2024-04-23 DIAGNOSIS — Z30.09 VASECTOMY EVALUATION: Primary | ICD-10-CM

## 2024-04-23 PROCEDURE — 99203 OFFICE O/P NEW LOW 30 MIN: CPT | Mod: 95 | Performed by: UROLOGY

## 2024-04-23 ASSESSMENT — PAIN SCALES - GENERAL: PAINLEVEL: NO PAIN (0)

## 2024-04-23 NOTE — PROGRESS NOTES
VASECTOMY CONSULTATION NOTE  DATE OF VISIT: 4/23/2024  ROSEANN DYSON   PATIENT NAME: Tony Ortega    YOB: 1980      REASON FOR CONSULTATION: Mr. Tony Ortega is a 44 year old year old gentleman who is seen today requesting a vasectomy. He has 2 children - 6 year old and 2 year old - and third child on the way due on 9/12/2024. He wishes to have a vasectomy for birth control. His wife is in agreement with this plan.     PAST MEDICAL HISTORY:   Past Medical History:   Diagnosis Date    Hypertension goal BP (blood pressure) < 140/90 7/15/2020    Overweight 9/16/2015     Problem list name updated by automated process. Provider to review       PAST SURGICAL HISTORY:   Past Surgical History:   Procedure Laterality Date    EYE SURGERY  2013/04    lasik       MEDICATIONS:   Current Outpatient Medications:     diclofenac (VOLTAREN) 1 % topical gel, 1 gram to affected areas on right heel 2-3 times daily as needed for pain., Disp: 100 g, Rfl: 3    lisinopril (ZESTRIL) 10 MG tablet, Take 1 tablet (10 mg) by mouth daily, Disp: 90 tablet, Rfl: 2    acetaminophen (TYLENOL) 500 MG tablet, Take 1-2 tablets (500-1,000 mg) by mouth every 6 hours as needed for mild pain (Patient not taking: Reported on 3/26/2024), Disp: 60 tablet, Rfl: 0    ALLERGIES:   Allergies   Allergen Reactions    Amoxicillin Rash       FAMILY HISTORY:   Family History   Problem Relation Age of Onset    Thyroid Disease Father         Doctor thinks it related to a traumatic headinjury    Cancer Maternal Grandfather     Diabetes Maternal Grandfather     Coronary Artery Disease Maternal Grandfather     Hypertension Maternal Grandfather     Colon Cancer Maternal Grandfather     Hypertension Paternal Grandfather     Hyperlipidemia No family hx of     Cerebrovascular Disease No family hx of     Prostate Cancer No family hx of     Depression No family hx of     Anxiety Disorder No family hx of     Thyroid Disease No family hx of     Asthma No family  hx of        SOCIAL HISTORY:   Social History     Socioeconomic History    Marital status:      Spouse name: Not on file    Number of children: Not on file    Years of education: Not on file    Highest education level: Not on file   Occupational History    Not on file   Tobacco Use    Smoking status: Former     Current packs/day: 0.00     Average packs/day: 1.5 packs/day for 11.6 years (17.4 ttl pk-yrs)     Types: Cigarettes     Start date: 2000     Quit date: 3/9/2012     Years since quittin.1     Passive exposure: Past    Smokeless tobacco: Never   Vaping Use    Vaping status: Never Used   Substance and Sexual Activity    Alcohol use: Yes     Comment: Occasionally    Drug use: No    Sexual activity: Yes     Partners: Female     Birth control/protection: None   Other Topics Concern    Parent/sibling w/ CABG, MI or angioplasty before 65F 55M? No   Social History Narrative    Not on file     Social Determinants of Health     Financial Resource Strain: Not on file   Food Insecurity: Not on file   Transportation Needs: Not on file   Physical Activity: Not on file   Stress: Not on file   Social Connections: Not on file   Interpersonal Safety: Not on file   Housing Stability: Not on file       PHYSICAL EXAM  Patient is a 44 year old  male   Vitals: There were no vitals taken for this visit.  There is no height or weight on file to calculate BMI.  General Appearance Adult:   Alert, no acute distress, oriented  Neuro: Alert, oriented, speech and mentation normal  Psych: affect and mood normal       DIAGNOSIS: Request for sterilization    PLAN: The risks of the procedure as well as expectations for recovery and outcomes were explained in detail to him.  He was counseled on the risks for bleeding infection and pain after the procedure. We discussed the risk of post-vasectomy pain syndrome.  He was instructed to continue to use contraception until he had proven azoospermia on a semen specimen.  This would  normally be collected at least 3 months after the procedure. Also discussed the rare, but possible risk of re-canalization of the vas, even after successful vasectomy with sterile semen specimen.  He was instructed to hold all anticoagulants medications for one week prior to the procedure.  It was recommended that he have someone else drive him home after his vasectomy.  In light of these risks and expectations he would like to proceed.  We are scheduling a vasectomy in the office in the near future.    Pt. Understands:  -1/1000-1/3000 risk of future pregnancy even with perfectly done vasectomy  -vasectomy is a permanent procedure    -he may cryopreserve sperm if he wishes   -1-5% risk of post-vasectomy pain syndrome   -1-5% risk of complication, primarily infection or bleeding  - he needs to have a semen sample that shows no sperm before getting approval for unprotected intercourse.      Thank you for the kind consultation.    Time spent: 15 minutes spent on the date of the encounter doing chart review, history and exam, documentation and further activities as noted above.     Harmeet Swann MD   Urology  [facility]  Clinic Phone [phone]          Virtual Visit Details    Type of service:  Video Visit     Originating Location (pt. Location): Home    Distant Location (provider location):  On-site  Platform used for Video Visit: Maureen

## 2024-04-23 NOTE — NURSING NOTE
Is the patient currently in the state of [place]? YES    Visit mode:VIDEO    If the visit is dropped, the patient can be reconnected by: VIDEO VISIT: Text to cell phone:   Telephone Information:   Mobile [phone]       Will anyone else be joining the visit? NO  (If patient encounters technical issues they should call [phone]    How would you like to obtain your AVS? MyChart    Are changes needed to the allergy or medication list? No    Are refills needed on medications prescribed by this physician? NO    Reason for visit: Consult (VASECTOMY CONSULT)    Carla RAMIREZ

## 2024-04-24 ENCOUNTER — MYC MEDICAL ADVICE (OUTPATIENT)
Dept: UROLOGY | Facility: CLINIC | Age: 44
End: 2024-04-24
Payer: COMMERCIAL

## 2024-06-04 DIAGNOSIS — I10 HYPERTENSION GOAL BP (BLOOD PRESSURE) < 140/90: ICD-10-CM

## 2024-06-04 RX ORDER — LISINOPRIL 10 MG/1
10 TABLET ORAL DAILY
Qty: 90 TABLET | Refills: 0 | Status: SHIPPED | OUTPATIENT
Start: 2024-06-04 | End: 2024-09-10

## 2024-06-06 ENCOUNTER — OFFICE VISIT (OUTPATIENT)
Dept: UROLOGY | Facility: CLINIC | Age: 44
End: 2024-06-06
Payer: COMMERCIAL

## 2024-06-06 VITALS — SYSTOLIC BLOOD PRESSURE: 158 MMHG | DIASTOLIC BLOOD PRESSURE: 88 MMHG | HEART RATE: 75 BPM

## 2024-06-06 DIAGNOSIS — Z30.2 ENCOUNTER FOR STERILIZATION: Primary | ICD-10-CM

## 2024-06-06 PROCEDURE — 55250 REMOVAL OF SPERM DUCT(S): CPT | Performed by: UROLOGY

## 2024-06-06 NOTE — PROGRESS NOTES
OFFICE VASECTOMY OPERATIVE NOTE  ROSEANN DYSON     DATE: 06/06/24  PATIENT: Tony Ortega    YOB: 1980    Tony Ortega is a 44 year old male.  He has 2 children and he wishes a vasectomy for birth control.  He has read the brochure and he has shaved himself.  I reviewed the vasectomy procedure with him explaining that it would be done with a local anesthetic given just in the location where the vasectomy would be done.  It would be done through incisions with the removal of segments of the vasa, cauterization of the ends, and burying the ends separate with sutures.      Pt. Understands:  -1/1000-1/3000 risk of future pregnancy even with perfectly done vasectomy  -vasectomy is a permanent procedure    -he may cryopreserve sperm if he wishes   -1-5% risk of post-vasectomy pain syndrome   -1-5% risk of complication, primarily infection or bleeding  - he needs to have a semen sample that shows no sperm before getting approval for unprotected intercourse.      Complications such as bleeding, infection, and damage to other tissues in the area were discussed. I recommended that an ice bag be placed on the scrotum off and on tonight to help reduce pain and swelling.      He was reminded that he was not sterile immediately after the vasectomy that it would take at least 20 ejaculations to empty the vas of any remaining sperm.  He was not to provide a semen sample until after the 20th ejaculation and not before 12 weeks after the vas. He was  to fulfill both of those requirements.   He understands it is his responsibility to find out the results of the vas before proceeding with intercourse without birth control protection.  Other items discussed were activity afterwards, returning to work, voluntary physical activity,  resuming sexual activity, clothing to wear, bathing, and care of the vas site and expected changes in the site as healing progresses.  After signing the permit, bilateral vasectomy was done  as described below.     ANESTHESIA: Local    DETAILS OF PROCEDURE: The risks of the procedure were explained in detail to the patient and informed consent was obtained. The patient was placed supine on the procedure table and the penis and scrotum were prepped and draped in the standard sterile fashion. The right vas deferens was isolated and brought up to the skin. 1% lidocaine local anesthesia was used to infiltrate the skin and the spermatic cord. A small incision was created and adventitial tissues were swept away from the vas. A 1 cm segment of the vas was excised and sent for pathology. The proximal and distal lumina of the vas were cauterized and then each segment was tied off in a knuckling-fashion with a 3-0 vicryl suture. Hemostasis was ensured and the segments were released back into the scrotum. Meticulous hemostasis was achieved. The skin was closed with 3-0 chromic suture in a horizontal mattress fashion. Next the left vas was brought to the skin and a vasectomy was performed in the similar fashion. The skin was closed with 3-0 chromic suture in a horizontal mattress fashion.    COMPLICATIONS: None    TAKE HOME MEDICATIONS: Tylenol every 6 hours, PRN    DISMISSAL INSTRUCTIONS:  - Ice pack to scrotum 15 to 20 minutes each hour awake for 36 to 40 hours.  - No strenuous activity or ejaculation for at least 7 days.  - No unprotected sexual activity until proven azoospermia on semen samples at 3 months.  - Referred to patient handout for normal postop expectations and indications to contact nurse or physician.    M.D.: Harmeet Swann MD

## 2024-06-06 NOTE — PATIENT INSTRUCTIONS
Vasectomy Post-Op Care Instructions     You may go home after the procedure is completed. There may be some pain in your groin for 3 or 4 days after the operation. Some blood or yellow liquid may ooze from the cuts on the outside. The area around the cuts may swell and bruise. The sutures will dissolve on their own and do not require removal by the physician.    The first 48 hours after the procedure are crucial to healing. Generally, you may feel very good the day after the procedure, but that does not mean it is time to go back to normal activities. Resuming normal activities too soon is likely to cause internal bleeding and lots of pain.      Your provider may advise the following ways to care for yourself after the procedure:    Put an ice bag or package of frozen peas covered by a thin towel over the scrotum after the procedure. Leave the ice on the area for 30 minutes and then take it off for 30 minutes. Do this off and on for at least 24 hours.      Avoid all heavy lifting (greater than 10 pounds) for at least one week.    Wear a jockstrap or tight-fitting underwear for approximately one week to support the scrotum (testicles) and help reduce discomfort.    Take a pain reliever, such as Acetaminophen (Tylenol) or Ibuprofen (Advil), for any pain after the procedure. Your provider may prescribe a stronger pain medicine if it is needed.    You should be able to return to work in 48 hours, but strenuous activity should be avoided for two weeks.    Do not submerge the incision for 1 week following the procedure.    Ejaculation should be avoided for one week to allow the area to heal.    Follow-Up    You will need to have a negative post vasectomy analyses (no sperm seen) before you can discontinue birth control.    Semen Analysis   Schedule the post-vasectomy semen analysis three months after your vasectomy. You will need to ejaculate at least 20 times between your surgery and the first sample. Clinic staff will  contact your regarding your results via phone.    You will be given a container after the procedure. Collect the specimen at home by masturbation only (no lubrication, powders, saliva, or intercourse can be used) and bring it to the laboratory. You must have an appointment to drop off your specimen. The specimen needs to be delivered to the lab within 30-45 minutes.    Call [phone] with questions. For concerns or questions after hours or on weekends, please page the Urology Resident on-call: [phone].

## 2024-06-06 NOTE — NURSING NOTE
Tony Ortega's goals for this visit include:   Chief Complaint   Patient presents with    Vasectomy     Voluntary sterilization       He requests these members of his care team be copied on today's visit information:       PCP: [facility]    Referring Provider:  Referred Self, MD  No address on file    BP (!) 158/88 (BP Location: Right arm, Patient Position: Sitting, Cuff Size: Adult Large)   Pulse 75  Didn't take medication today will take medication when home    Do you need any medication refills at today's visit?     Shae Hagen LPN on 6/6/2024 at 10:56 AM

## 2024-06-28 ENCOUNTER — PATIENT OUTREACH (OUTPATIENT)
Dept: CARE COORDINATION | Facility: CLINIC | Age: 44
End: 2024-06-28
Payer: COMMERCIAL

## 2024-07-12 ENCOUNTER — PATIENT OUTREACH (OUTPATIENT)
Dept: CARE COORDINATION | Facility: CLINIC | Age: 44
End: 2024-07-12
Payer: COMMERCIAL

## 2024-09-08 ENCOUNTER — HEALTH MAINTENANCE LETTER (OUTPATIENT)
Age: 44
End: 2024-09-08

## 2024-09-08 DIAGNOSIS — I10 HYPERTENSION GOAL BP (BLOOD PRESSURE) < 140/90: ICD-10-CM

## 2024-09-10 RX ORDER — LISINOPRIL 10 MG/1
10 TABLET ORAL DAILY
Qty: 30 TABLET | Refills: 0 | Status: SHIPPED | OUTPATIENT
Start: 2024-09-10

## 2024-09-10 NOTE — TELEPHONE ENCOUNTER
30 day refill provided.   No future refills without in person office visit. He can see any provider for HTN follow up.

## 2024-09-10 NOTE — TELEPHONE ENCOUNTER
Requested Prescriptions   Pending Prescriptions Disp Refills    lisinopril (ZESTRIL) 10 MG tablet [Pharmacy Med Name: Lisinopril Oral Tablet 10 MG] 90 tablet 0     Sig: Take 1 tablet (10 mg) by mouth daily       ACE Inhibitors (Including Combos) Protocol Failed - 9/8/2024  2:01 AM        Failed - Blood pressure under 140/90 in past 12 months- Clinicial or Patient Reported     BP Readings from Last 3 Encounters:   06/06/24 (!) 158/88   03/21/24 132/84   10/31/23 (!) 160/96       No data recorded            Failed - Recent (12 mo) or future (90 days) visit within the authorizing provider's specialty     The patient must have completed an in-person or virtual visit within the past 12 months or has a future visit scheduled within the next 90 days with the authorizing provider s specialty.  Urgent care and e-visits do not quality as an office visit for this protocol.          Failed - Most recent GFR on file in the past 12 months >30        Failed - Normal serum potassium on file in past 12 months     Recent Labs   Lab Test 06/09/23  1617   POTASSIUM 4.2             Passed - Medication is active on med list        Passed - Medication indicated for associated diagnosis     Medication is associated with one or more of the following diagnoses:     Chronic Kidney Disease (CKD)   Coronary Artery Disease (CAD)   Diabetes   Heart Failure (HF)   Hypertension (HTN)   Nephropathy   History of myocarditis   Tachycardia induced cardiomyopathy   STEMI (ST elevation myocardial infarction)   Spontaneous dissection of coronary artery   Status post percutaneous transluminal coronary angioplasty            Passed - Patient is age 18 or older

## 2024-09-12 ENCOUNTER — TELEPHONE (OUTPATIENT)
Dept: FAMILY MEDICINE | Facility: CLINIC | Age: 44
End: 2024-09-12
Payer: COMMERCIAL

## 2024-09-12 NOTE — TELEPHONE ENCOUNTER
Patient Quality Outreach    Patient is due for the following:   Hypertension -  BP check  Physical Preventive Adult Physical      Topic Date Due    Hepatitis B Vaccine (1 of 3 - 19+ 3-dose series) Never done    Flu Vaccine (1) 09/01/2024    COVID-19 Vaccine (3 - 2023-24 season) 09/01/2024       Next Steps:   Patient has upcoming appointment, these items will be addressed at that time.    Type of outreach:    Sent Zwipe message.    Next Steps:  Reach out within 90 days via Zwipe.    Max number of attempts reached: Yes. Will try again in 90 days if patient still on fail list.    Questions for provider review:    None           Carmencita Hair MA

## 2024-10-08 DIAGNOSIS — I10 HYPERTENSION GOAL BP (BLOOD PRESSURE) < 140/90: ICD-10-CM

## 2024-10-09 ENCOUNTER — LAB (OUTPATIENT)
Dept: LAB | Facility: CLINIC | Age: 44
End: 2024-10-09
Payer: COMMERCIAL

## 2024-10-09 DIAGNOSIS — Z30.2 ENCOUNTER FOR STERILIZATION: ICD-10-CM

## 2024-10-09 LAB — SEMEN ANALYSIS P VAS PNL: NORMAL

## 2024-10-09 PROCEDURE — 89321 SEMEN ANAL SPERM DETECTION: CPT

## 2024-10-15 RX ORDER — LISINOPRIL 10 MG/1
10 TABLET ORAL DAILY
Qty: 30 TABLET | Refills: 0 | Status: SHIPPED | OUTPATIENT
Start: 2024-10-15 | End: 2024-10-22

## 2024-10-21 SDOH — HEALTH STABILITY: PHYSICAL HEALTH: ON AVERAGE, HOW MANY DAYS PER WEEK DO YOU ENGAGE IN MODERATE TO STRENUOUS EXERCISE (LIKE A BRISK WALK)?: 5 DAYS

## 2024-10-21 SDOH — HEALTH STABILITY: PHYSICAL HEALTH: ON AVERAGE, HOW MANY MINUTES DO YOU ENGAGE IN EXERCISE AT THIS LEVEL?: 30 MIN

## 2024-10-21 ASSESSMENT — SOCIAL DETERMINANTS OF HEALTH (SDOH): HOW OFTEN DO YOU GET TOGETHER WITH FRIENDS OR RELATIVES?: ONCE A WEEK

## 2024-10-22 ENCOUNTER — OFFICE VISIT (OUTPATIENT)
Dept: FAMILY MEDICINE | Facility: CLINIC | Age: 44
End: 2024-10-22
Attending: FAMILY MEDICINE
Payer: COMMERCIAL

## 2024-10-22 VITALS
HEART RATE: 76 BPM | HEIGHT: 72 IN | OXYGEN SATURATION: 97 % | SYSTOLIC BLOOD PRESSURE: 137 MMHG | BODY MASS INDEX: 36.41 KG/M2 | TEMPERATURE: 98.8 F | DIASTOLIC BLOOD PRESSURE: 89 MMHG | RESPIRATION RATE: 16 BRPM | WEIGHT: 268.8 LBS

## 2024-10-22 DIAGNOSIS — E66.01 CLASS 2 SEVERE OBESITY DUE TO EXCESS CALORIES WITH SERIOUS COMORBIDITY AND BODY MASS INDEX (BMI) OF 36.0 TO 36.9 IN ADULT (H): ICD-10-CM

## 2024-10-22 DIAGNOSIS — Z00.00 ROUTINE GENERAL MEDICAL EXAMINATION AT A HEALTH CARE FACILITY: Primary | ICD-10-CM

## 2024-10-22 DIAGNOSIS — R73.03 PREDIABETES: ICD-10-CM

## 2024-10-22 DIAGNOSIS — I10 HYPERTENSION GOAL BP (BLOOD PRESSURE) < 140/90: ICD-10-CM

## 2024-10-22 DIAGNOSIS — E66.812 CLASS 2 SEVERE OBESITY DUE TO EXCESS CALORIES WITH SERIOUS COMORBIDITY AND BODY MASS INDEX (BMI) OF 36.0 TO 36.9 IN ADULT (H): ICD-10-CM

## 2024-10-22 PROCEDURE — 99396 PREV VISIT EST AGE 40-64: CPT | Performed by: FAMILY MEDICINE

## 2024-10-22 RX ORDER — LISINOPRIL 10 MG/1
10 TABLET ORAL DAILY
Qty: 90 TABLET | Refills: 3 | Status: SHIPPED | OUTPATIENT
Start: 2024-10-22

## 2024-10-22 ASSESSMENT — PAIN SCALES - GENERAL: PAINLEVEL: NO PAIN (0)

## 2024-10-22 NOTE — PATIENT INSTRUCTIONS
At [facility], we strive to deliver an exceptional experience to you, every time we see you. If you receive a survey, please let us know what we are doing well and/or what we could improve upon, as we do value your feedback.  If you have MyChart, you can expect to receive results automatically within 24 hours of their completion.  Your provider will send a note interpreting your results as well.   If you do not have MyChart, you should receive your results in about a week by mail.    Your care team:                            Family Medicine Internal Medicine   MD William Tsai, MD Sri May, MD Chevy Hamm, MD Nancy Wilkinson, PAIvetteC    Adelfo Best, MD Pediatrics   Wendy Carranza, MD Prerna Dawson, MD Geno Castanon, APRN CNP Lynn Hawley APRN CNP   Jasmina Chowdhury, MD Betzaida Hendricks, MD Marissa Dunn, CNP     Breezy Romeo, CNP Same-Day Provider (No follow-up visits)   GENE Kearney, DNP Mihaela Aguirre, PA-C   GENE Britton, FNP, BC [facility]     Clinic hours: Monday - Thursday 7 am-6 pm; Fridays 7 am-5 pm.   Urgent care: Monday - Friday 10 am- 8 pm; Saturday and Sunday 9 am-5 pm.    Clinic: [phone]       [facility]: Monday - Thursday 8 am - 7 pm; Friday 8 am - 6 pm  [facility]: [phone]     Patient Education   Preventive Care Advice   This is general advice given by our system to help you stay healthy. However, your care team may have specific advice just for you. Please talk to your care team about your preventive care needs.  Nutrition  Eat 5 or more servings of fruits and vegetables each day.  Try wheat bread, brown rice and whole grain pasta (instead of white bread, rice, and pasta).  Get enough calcium and vitamin D. Check the label on foods and aim for 100% of the RDA (recommended daily allowance).  Lifestyle  Exercise at least 150  minutes each week  (30 minutes a day, 5 days a week).  Do muscle strengthening activities 2 days a week. These help control your weight and prevent disease.  No smoking.  Wear sunscreen to prevent skin cancer.  Have a dental exam and cleaning every 6 months.  Yearly exams  See your health care team every year to talk about:  Any changes in your health.  Any medicines your care team has prescribed.  Preventive care, family planning, and ways to prevent chronic diseases.  Shots (vaccines)   HPV shots (up to age 26), if you've never had them before.  Hepatitis B shots (up to age 59), if you've never had them before.  COVID-19 shot: Get this shot when it's due.  Flu shot: Get a flu shot every year.  Tetanus shot: Get a tetanus shot every 10 years.  Pneumococcal, hepatitis A, and RSV shots: Ask your care team if you need these based on your risk.  Shingles shot (for age 50 and up)  General health tests  Diabetes screening:  Starting at age 35, Get screened for diabetes at least every 3 years.  If you are younger than age 35, ask your care team if you should be screened for diabetes.  Cholesterol test: At age 39, start having a cholesterol test every 5 years, or more often if advised.  Bone density scan (DEXA): At age 50, ask your care team if you should have this scan for osteoporosis (brittle bones).  Hepatitis C: Get tested at least once in your life.  STIs (sexually transmitted infections)  Before age 24: Ask your care team if you should be screened for STIs.  After age 24: Get screened for STIs if you're at risk. You are at risk for STIs (including HIV) if:  You are sexually active with more than one person.  You don't use condoms every time.  You or a partner was diagnosed with a sexually transmitted infection.  If you are at risk for HIV, ask about PrEP medicine to prevent HIV.  Get tested for HIV at least once in your life, whether you are at risk for HIV or not.  Cancer screening tests  Cervical cancer screening:  If you have a cervix, begin getting regular cervical cancer screening tests starting at age 21.  Breast cancer scan (mammogram): If you've ever had breasts, begin having regular mammograms starting at age 40. This is a scan to check for breast cancer.  Colon cancer screening: It is important to start screening for colon cancer at age 45.  Have a colonoscopy test every 10 years (or more often if you're at risk) Or, ask your provider about stool tests like a FIT test every year or Cologuard test every 3 years.  To learn more about your testing options, visit:   .  For help making a decision, visit:   https://bit.ly/ir26238.  Prostate cancer screening test: If you have a prostate, ask your care team if a prostate cancer screening test (PSA) at age 55 is right for you.  Lung cancer screening: If you are a current or former smoker ages 50 to 80, ask your care team if ongoing lung cancer screenings are right for you.  For informational purposes only. Not to replace the advice of your health care provider. Copyright   2023 [employer]. All rights reserved. Clinically reviewed by the [facility] Transitions Program. [employer] 685544 - REV 01/24.

## 2024-10-22 NOTE — PROGRESS NOTES
Preventive Care Visit  [facility]  [facility], Family Medicine  Oct 22, 2024      Assessment & Plan     Routine general medical examination at a health care facility    - CBC with platelets; Future  - Comprehensive metabolic panel (BMP + Alb, Alk Phos, ALT, AST, Total. Bili, TP); Future  - Lipid panel reflex to direct LDL Fasting; Future    Hypertension goal BP (blood pressure) < 140/90  -At goal today, refilled lisinopril.    - Albumin Random Urine Quantitative with Creat Ratio; Future  - lisinopril (ZESTRIL) 10 MG tablet; Take 1 tablet (10 mg) by mouth daily.    Class 2 severe obesity due to excess calories with serious comorbidity and body mass index (BMI) of 36.0 to 36.9 in adult (H)  -Recommended healthy diet and active lifestyle    Prediabetes    - Hemoglobin A1c; Future        Patient has been advised of split billing requirements and indicates understanding: Yes        BMI  Estimated body mass index is 36.46 kg/m  as calculated from the following:    Height as of this encounter: 1.829 m (6').    Weight as of this encounter: 121.9 kg (268 lb 12.8 oz).   Weight management plan: Discussed healthy diet and exercise guidelines    Counseling  Appropriate preventive services were addressed with this patient via screening, questionnaire, or discussion as appropriate for fall prevention, nutrition, physical activity, Tobacco-use cessation, social engagement, weight loss and cognition.  Checklist reviewing preventive services available has been given to the patient.  Reviewed patient's diet, addressing concerns and/or questions.           Maikel Jimenez is a 44 year old, presenting for the following:  Physical        10/22/2024     3:23 PM   Additional Questions   Roomed by eduin   Accompanied by self         10/22/2024     3:23 PM   Patient Reported Additional Medications   Patient reports taking the following new medications no        Health Care  Directive  Patient does not have a Health Care Directive or Living Will:     HPI            10/21/2024   General Health   How would you rate your overall physical health? Good   Feel stress (tense, anxious, or unable to sleep) To some extent      (!) STRESS CONCERN      10/21/2024   Nutrition   Three or more servings of calcium each day? Yes   Diet: Regular (no restrictions)   How many servings of fruit and vegetables per day? 4 or more   How many sweetened beverages each day? 0-1            10/21/2024   Exercise   Days per week of moderate/strenous exercise 5 days   Average minutes spent exercising at this level 30 min            10/21/2024   Social Factors   Frequency of gathering with friends or relatives Once a week   Worry food won't last until get money to buy more No   Food not last or not have enough money for food? No   Do you have housing? (Housing is defined as stable permanent housing and does not include staying ouside in a car, in a tent, in an abandoned building, in an overnight shelter, or couch-surfing.) Yes   Are you worried about losing your housing? No   Lack of transportation? No   Unable to get utilities (heat,electricity)? No            10/21/2024   Dental   Dentist two times every year? Yes            10/21/2024   TB Screening   Were you born outside of the [place]? No                  10/21/2024   Substance Use   Alcohol more than 3/day or more than 7/wk No   Do you use any other substances recreationally? No        Social History     Tobacco Use    Smoking status: Former     Current packs/day: 0.00     Average packs/day: 1.5 packs/day for 11.6 years (17.4 ttl pk-yrs)     Types: Cigarettes     Start date: 2000     Quit date: 3/9/2012     Years since quittin.6     Passive exposure: Past    Smokeless tobacco: Never   Vaping Use    Vaping status: Never Used   Substance Use Topics    Alcohol use: Yes     Comment: Occasionally    Drug use: No           10/21/2024   STI Screening   New sexual  partner(s) since last STI/HIV test? No      ASCVD Risk   The 10-year ASCVD risk score (Otis WEI, et al., 2019) is: 3.6%    Values used to calculate the score:      Age: 44 years      Sex: Male      Is Non- : No      Diabetic: No      Tobacco smoker: No      Systolic Blood Pressure: 147 mmHg      Is BP treated: Yes      HDL Cholesterol: 31 mg/dL      Total Cholesterol: 176 mg/dL       Reviewed and updated as needed this visit by Provider                          Review of Systems  Constitutional, HEENT, cardiovascular, pulmonary, gi and gu systems are negative, except as otherwise noted.     Objective    Exam  BP (!) 147/91 (BP Location: Left arm, Patient Position: Sitting, Cuff Size: Adult Large)   Pulse 76   Temp 98.8  F (37.1  C) (Oral)   Resp 16   Ht 1.829 m (6')   Wt 121.9 kg (268 lb 12.8 oz)   SpO2 97%   BMI 36.46 kg/m     Estimated body mass index is 36.46 kg/m  as calculated from the following:    Height as of this encounter: 1.829 m (6').    Weight as of this encounter: 121.9 kg (268 lb 12.8 oz).    Physical Exam  GENERAL: alert and no distress  NECK: no adenopathy, no asymmetry, masses, or scars  RESP: lungs clear to auscultation - no rales, rhonchi or wheezes  CV: regular rate and rhythm, normal S1 S2, no S3 or S4, no murmur, click or rub, no peripheral edema  ABDOMEN: soft, nontender, no hepatosplenomegaly, no masses and bowel sounds normal  MS: no gross musculoskeletal defects noted, no edema        Signed Electronically by: Jasmina Chowdhury MD

## 2024-10-24 ENCOUNTER — LAB (OUTPATIENT)
Dept: LAB | Facility: CLINIC | Age: 44
End: 2024-10-24
Payer: COMMERCIAL

## 2024-10-24 DIAGNOSIS — Z00.00 ROUTINE GENERAL MEDICAL EXAMINATION AT A HEALTH CARE FACILITY: ICD-10-CM

## 2024-10-24 DIAGNOSIS — I10 HYPERTENSION GOAL BP (BLOOD PRESSURE) < 140/90: ICD-10-CM

## 2024-10-24 DIAGNOSIS — R73.03 PREDIABETES: ICD-10-CM

## 2024-10-24 LAB
ALBUMIN SERPL BCG-MCNC: 4.4 G/DL (ref 3.5–5.2)
ALP SERPL-CCNC: 67 U/L (ref 40–150)
ALT SERPL W P-5'-P-CCNC: 64 U/L (ref 0–70)
ANION GAP SERPL CALCULATED.3IONS-SCNC: 10 MMOL/L (ref 7–15)
AST SERPL W P-5'-P-CCNC: 37 U/L (ref 0–45)
BILIRUB SERPL-MCNC: 1.3 MG/DL
BUN SERPL-MCNC: 17.8 MG/DL (ref 6–20)
CALCIUM SERPL-MCNC: 9 MG/DL (ref 8.8–10.4)
CHLORIDE SERPL-SCNC: 107 MMOL/L (ref 98–107)
CHOLEST SERPL-MCNC: 158 MG/DL
CREAT SERPL-MCNC: 1.1 MG/DL (ref 0.67–1.17)
CREAT UR-MCNC: 131 MG/DL
EGFRCR SERPLBLD CKD-EPI 2021: 85 ML/MIN/1.73M2
ERYTHROCYTE [DISTWIDTH] IN BLOOD BY AUTOMATED COUNT: 12.5 % (ref 10–15)
EST. AVERAGE GLUCOSE BLD GHB EST-MCNC: 105 MG/DL
FASTING STATUS PATIENT QL REPORTED: YES
FASTING STATUS PATIENT QL REPORTED: YES
GLUCOSE SERPL-MCNC: 101 MG/DL (ref 70–99)
HBA1C MFR BLD: 5.3 % (ref 0–5.6)
HCO3 SERPL-SCNC: 23 MMOL/L (ref 22–29)
HCT VFR BLD AUTO: 41.3 % (ref 40–53)
HDLC SERPL-MCNC: 29 MG/DL
HGB BLD-MCNC: 14 G/DL (ref 13.3–17.7)
LDLC SERPL CALC-MCNC: 95 MG/DL
MCH RBC QN AUTO: 29.3 PG (ref 26.5–33)
MCHC RBC AUTO-ENTMCNC: 33.9 G/DL (ref 31.5–36.5)
MCV RBC AUTO: 86 FL (ref 78–100)
MICROALBUMIN UR-MCNC: <12 MG/L
MICROALBUMIN/CREAT UR: NORMAL MG/G{CREAT}
NONHDLC SERPL-MCNC: 129 MG/DL
PLATELET # BLD AUTO: 190 10E3/UL (ref 150–450)
POTASSIUM SERPL-SCNC: 4.1 MMOL/L (ref 3.4–5.3)
PROT SERPL-MCNC: 7.3 G/DL (ref 6.4–8.3)
RBC # BLD AUTO: 4.78 10E6/UL (ref 4.4–5.9)
SODIUM SERPL-SCNC: 140 MMOL/L (ref 135–145)
TRIGL SERPL-MCNC: 168 MG/DL
WBC # BLD AUTO: 8.2 10E3/UL (ref 4–11)

## 2024-10-24 PROCEDURE — 85027 COMPLETE CBC AUTOMATED: CPT

## 2024-10-24 PROCEDURE — 80053 COMPREHEN METABOLIC PANEL: CPT

## 2024-10-24 PROCEDURE — 82570 ASSAY OF URINE CREATININE: CPT

## 2024-10-24 PROCEDURE — 80061 LIPID PANEL: CPT

## 2024-10-24 PROCEDURE — 36415 COLL VENOUS BLD VENIPUNCTURE: CPT

## 2024-10-24 PROCEDURE — 83036 HEMOGLOBIN GLYCOSYLATED A1C: CPT

## 2024-10-24 PROCEDURE — 82043 UR ALBUMIN QUANTITATIVE: CPT

## 2024-10-25 DIAGNOSIS — E78.1 HYPERTRIGLYCERIDEMIA: Primary | ICD-10-CM

## 2024-11-30 ENCOUNTER — OFFICE VISIT (OUTPATIENT)
Dept: URGENT CARE | Facility: URGENT CARE | Age: 44
End: 2024-11-30
Payer: COMMERCIAL

## 2024-11-30 VITALS
WEIGHT: 267.8 LBS | HEART RATE: 71 BPM | TEMPERATURE: 97.3 F | SYSTOLIC BLOOD PRESSURE: 159 MMHG | DIASTOLIC BLOOD PRESSURE: 106 MMHG | OXYGEN SATURATION: 96 % | RESPIRATION RATE: 18 BRPM | BODY MASS INDEX: 36.32 KG/M2

## 2024-11-30 DIAGNOSIS — H66.92 ACUTE LEFT OTITIS MEDIA: Primary | ICD-10-CM

## 2024-11-30 DIAGNOSIS — H60.392 INFECTIVE OTITIS EXTERNA, LEFT: ICD-10-CM

## 2024-11-30 PROCEDURE — 99213 OFFICE O/P EST LOW 20 MIN: CPT | Performed by: NURSE PRACTITIONER

## 2024-11-30 RX ORDER — CEFDINIR 300 MG/1
300 CAPSULE ORAL 2 TIMES DAILY
Qty: 14 CAPSULE | Refills: 0 | Status: SHIPPED | OUTPATIENT
Start: 2024-11-30 | End: 2024-12-07

## 2024-11-30 RX ORDER — OFLOXACIN 3 MG/ML
10 SOLUTION AURICULAR (OTIC) DAILY
Qty: 5 ML | Refills: 0 | Status: SHIPPED | OUTPATIENT
Start: 2024-11-30 | End: 2024-12-07

## 2024-11-30 ASSESSMENT — PAIN SCALES - GENERAL: PAINLEVEL_OUTOF10: MILD PAIN (3)

## 2024-11-30 NOTE — PROGRESS NOTES
Assessment & Plan     Acute left otitis media    - cefdinir (OMNICEF) 300 MG capsule  Dispense: 14 capsule; Refill: 0    Infective otitis externa, left    - ofloxacin (FLOXIN) 0.3 % otic solution  Dispense: 5 mL; Refill: 0     Prescriptions sent to pharmacy for ofloxacin daily for 7 days to left ear for otitis externa and cefdinir twice daily for 7 days for otitis media which he has tolerated with rash to amoxicillin. Discussed ear pressure can persist even after infection clears and recommend nasal saline, flonase nasal spray, tylenol and ibuprofen as needed.     Follow-up with PCP if symptoms persist for 7 days, and sooner if symptoms worsen or new symptoms develop.     Discussed red flag symptoms which warrant immediate visit in emergency room    All questions were answered and patient verbalized understanding. AVS sent via Otis Ruvalcaba NP  [facility] [place] DANIEL Jimenez is a 44 year old male who presents to clinic today for the following health issues:  Chief Complaint   Patient presents with    Ear Problem     Left ear pain for the past 2 days, feels as though there is fluid in it and today woke up and is unable to hear out of it      Patient presents for evaluation of left ear pain for 2 days. Associated symptoms: decreased hearing today, clear drainage which had some blood in it. Has had some nasal congestion which resolved. Nothing tried for symptoms. Pain is mild.   Denies fever, cough, sore throat.     Problem list, Medication list, Allergies, and Medical history reviewed in EPIC.    ROS:  Review of systems negative except for noted above        Objective    BP (!) 159/106 (BP Location: Left arm, Patient Position: Sitting, Cuff Size: Adult Regular)   Pulse 71   Temp 97.3  F (36.3  C) (Tympanic)   Resp 18   Wt 121.5 kg (267 lb 12.8 oz)   SpO2 96%   BMI 36.32 kg/m    Physical Exam  Constitutional:       General: He is not in acute distress.      Appearance: He is not toxic-appearing or diaphoretic.   HENT:      Head: Normocephalic and atraumatic.      Right Ear: Tympanic membrane, ear canal and external ear normal. There is no impacted cerumen.      Left Ear: There is no impacted cerumen.      Ears:      Comments: Tenderness with palpation left tragus with moderate erythema and edema left ear canal with TM erythematous and bulging     Nose:      Comments: Mild nasal congestion     Mouth/Throat:      Mouth: Mucous membranes are moist.      Pharynx: Oropharynx is clear. No oropharyngeal exudate or posterior oropharyngeal erythema.   Cardiovascular:      Rate and Rhythm: Normal rate and regular rhythm.      Heart sounds: Normal heart sounds.   Pulmonary:      Effort: Pulmonary effort is normal. No respiratory distress.      Breath sounds: Normal breath sounds. No wheezing, rhonchi or rales.   Neurological:      Mental Status: He is alert.

## 2025-02-06 ENCOUNTER — OFFICE VISIT (OUTPATIENT)
Dept: FAMILY MEDICINE | Facility: CLINIC | Age: 45
End: 2025-02-06
Payer: COMMERCIAL

## 2025-02-06 VITALS
HEART RATE: 73 BPM | DIASTOLIC BLOOD PRESSURE: 82 MMHG | TEMPERATURE: 97.3 F | WEIGHT: 253.2 LBS | HEIGHT: 72 IN | RESPIRATION RATE: 18 BRPM | BODY MASS INDEX: 34.29 KG/M2 | OXYGEN SATURATION: 97 % | SYSTOLIC BLOOD PRESSURE: 137 MMHG

## 2025-02-06 DIAGNOSIS — N50.812 LEFT TESTICULAR PAIN: Primary | ICD-10-CM

## 2025-02-06 LAB
ALBUMIN UR-MCNC: NEGATIVE MG/DL
APPEARANCE UR: CLEAR
BACTERIA #/AREA URNS HPF: ABNORMAL /HPF
BILIRUB UR QL STRIP: NEGATIVE
COLOR UR AUTO: YELLOW
GLUCOSE UR STRIP-MCNC: NEGATIVE MG/DL
HGB UR QL STRIP: NEGATIVE
KETONES UR STRIP-MCNC: NEGATIVE MG/DL
LEUKOCYTE ESTERASE UR QL STRIP: NEGATIVE
NITRATE UR QL: NEGATIVE
PH UR STRIP: 5.5 [PH] (ref 5–7)
RBC #/AREA URNS AUTO: ABNORMAL /HPF
SP GR UR STRIP: 1.01 (ref 1–1.03)
UROBILINOGEN UR STRIP-ACNC: 0.2 E.U./DL
WBC #/AREA URNS AUTO: ABNORMAL /HPF

## 2025-02-06 ASSESSMENT — PAIN SCALES - GENERAL: PAINLEVEL_OUTOF10: NO PAIN (0)

## 2025-02-06 NOTE — PROGRESS NOTES
Assessment & Plan     (N50.812) Left testicular pain  (primary encounter diagnosis)  Comment:   Plan: US Testicular & Scrotum w Doppler Ltd, UA with         Microscopic reflex to Culture - lab collect,         NEISSERIA GONORRHOEA PCR, CHLAMYDIA TRACHOMATIS        PCR        Firm mass outside of left testicles, proximal scrotal area. Will check ultrasound. History of vasectomy. Rule out infectious process with urine tests.      See Patient Instructions    Maikel Jimenez is a 44 year old, presenting for the following health issues:  Testicular Problem      2/6/2025     2:01 PM   Additional Questions   Roomed by Carmencita     History of Present Illness       Reason for visit:  Woke up with testicular pain 2 days ago. pain mostly went away that day however lingering pain still occurs. also have a lump from my vasectomy on that side.  Symptom onset:  1-3 days ago  Symptoms include:  Testicular pain  Symptom intensity:  Severe  Symptom progression:  Improving  Had these symptoms before:  No  What makes it worse:  Lifting heavy objects  What makes it better:  Aleve, ice,and rest   He is taking medications regularly.         Review of Systems  Constitutional, HEENT, cardiovascular, pulmonary, GI, , musculoskeletal, neuro, skin, endocrine and psych systems are negative, except as otherwise noted.      Objective    /82 (BP Location: Left arm, Patient Position: Sitting, Cuff Size: Adult Large)   Pulse 73   Temp 97.3  F (36.3  C) (Temporal)   Resp 18   Ht 1.829 m (6')   Wt 114.9 kg (253 lb 3.2 oz)   SpO2 97%   BMI 34.34 kg/m    Body mass index is 34.34 kg/m .  Physical Exam   GENERAL: alert and no distress  NECK: no adenopathy, no asymmetry, masses, or scars  RESP: lungs clear to auscultation - no rales, rhonchi or wheezes  CV: regular rate and rhythm, normal S1 S2, no S3 or S4, no murmur, click or rub, no peripheral edema  ABDOMEN: soft, nontender, no hepatosplenomegaly, no masses and bowel sounds  normal  MS: no gross musculoskeletal defects noted, no edema          Signed Electronically by: Adelfo Best MD, MD

## 2025-02-07 ENCOUNTER — ANCILLARY PROCEDURE (OUTPATIENT)
Dept: ULTRASOUND IMAGING | Facility: CLINIC | Age: 45
End: 2025-02-07
Attending: FAMILY MEDICINE
Payer: COMMERCIAL

## 2025-02-07 DIAGNOSIS — N50.812 LEFT TESTICULAR PAIN: ICD-10-CM

## 2025-02-07 PROCEDURE — 93976 VASCULAR STUDY: CPT | Performed by: RADIOLOGY

## 2025-02-07 PROCEDURE — 76870 US EXAM SCROTUM: CPT | Performed by: RADIOLOGY

## 2025-04-09 ENCOUNTER — OFFICE VISIT (OUTPATIENT)
Dept: UROLOGY | Facility: CLINIC | Age: 45
End: 2025-04-09
Attending: FAMILY MEDICINE
Payer: COMMERCIAL

## 2025-04-09 DIAGNOSIS — N50.812 LEFT TESTICULAR PAIN: ICD-10-CM

## 2025-04-09 DIAGNOSIS — N50.89 TESTICULAR MICROLITHIASIS: ICD-10-CM

## 2025-04-09 PROCEDURE — 99213 OFFICE O/P EST LOW 20 MIN: CPT | Performed by: UROLOGY

## 2025-04-09 NOTE — PROGRESS NOTES
am seeing Tony Ortega in consultation from Dr. Best for evaluation of testis pain.    HPI:  Tony Ortega is a 45 year old male with history of left testis pain.    Pain symtpoms:  Onset of the pain: 2 months ago, lasted about 1 week.    Subacute onset of left testis/ scrotal pain.  Was very severe at first,  prompted ER visit.  Scrotal ultrasound and urine studies were overall unremarkable for any severe issues.  No UTI.  History of left sperm granuloma.   Scrotal ultrasound showed 2cm left extratesticular nodule/ mass.    Pain lasted about 1 week, and pain subsequently resolved with rest and OTC analgesics .    Location/Radiation of the pain: was centered at left sperm granuloma, but did radiate.  Duration: 1 week, back to normal/baseline at this time.    Status-post vasectomy 6/2024  Post-vasectomy semen analysis showed azoospermia.      PAST MEDICAL HX:  Past Medical History:   Diagnosis Date    Hypertension goal BP (blood pressure) < 140/90 7/15/2020    Overweight 9/16/2015     Problem list name updated by automated process. Provider to review       PAST SURG HX:  Past Surgical History:   Procedure Laterality Date    EYE SURGERY  2013/04    lasik        FAMILY HX:  Family History   Problem Relation Age of Onset    Thyroid Disease Father         Doctor thinks it related to a traumatic headinjury    Cancer Maternal Grandfather     Diabetes Maternal Grandfather     Coronary Artery Disease Maternal Grandfather     Hypertension Maternal Grandfather     Colon Cancer Maternal Grandfather     Hypertension Paternal Grandfather     Hyperlipidemia No family hx of     Cerebrovascular Disease No family hx of     Prostate Cancer No family hx of     Depression No family hx of     Anxiety Disorder No family hx of     Thyroid Disease No family hx of     Asthma No family hx of        SOCIAL HX:  Social History     Tobacco Use    Smoking status: Former     Current packs/day: 0.00     Average packs/day: 1.5 packs/day for  11.6 years (17.4 ttl pk-yrs)     Types: Cigarettes     Start date: 2000     Quit date: 3/9/2012     Years since quittin.0     Passive exposure: Past    Smokeless tobacco: Never   Vaping Use    Vaping status: Never Used   Substance Use Topics    Alcohol use: Yes     Comment: Occasionally    Drug use: No       MEDICATIONS:  Current Outpatient Medications   Medication Sig Dispense Refill    lisinopril (ZESTRIL) 10 MG tablet Take 1 tablet (10 mg) by mouth daily. 90 tablet 3     No current facility-administered medications for this visit.       ALLERGIES:  Amoxicillin      GENERAL PHYSICAL EXAM:   Constitutional: No acute distress. Well nourished.   PSYCH: normal mood and affect.  NEURO: normal gait, no focal deficits.   EYES: anicteric, EOMI, PERR.  CARDIOPULMONARY: breathing non-labored, pulse regular rate/rhythm, no peripheral edema.  GI: Abdomen soft, non-tender, nondistended   MUSCULOSKELETAL: normal limb proportions, no muscle wasting, no contractures.  SKIN: Normal virilized hair distribution, no lesions, warts or rashes over genitalia, abdomen extremities or face.  HEME/LYMPH: no ecchymosis, no lymphadenopathy in groin, no lymphedema.     EXAM:  Phallus normal   Left testis descended , size is normal , consistency is normal . No intra-testicular masses.   Right testis descended , size is normal, consistency is normal . No intra-testicular masses.   Epididymes present, non-tender, not-enlarged.   Left cord: Vas present. There is a 2cm sperm granuloma at the previous vasectomy site in the mid straight vas deferens.  Right cord: Vas present. No granuloma noted.  No obvious varicoceles.     Prostate exam: not indicated at this time.    Imaging/labs:  Lab Results   Component Value Date    CR 1.10 10/24/2024    CR 0.97 2023    CR 1.05 2022    CR 1.13 2020     Reviewed normal UA and Gc/Chlam testing from ER visit 2025  Component      Latest Ref Rng 2025  2:26 PM   Color Urine       Colorless, Straw, Light Yellow, Yellow  Yellow    Appearance Urine      Clear  Clear    Glucose Urine      Negative mg/dL Negative    Bilirubin Urine      Negative  Negative    Ketones Urine      Negative mg/dL Negative    Specific Gravity Urine      1.003 - 1.035  1.015    Blood Urine      Negative  Negative    pH Urine      5.0 - 7.0  5.5    Protein Albumin Urine      Negative mg/dL Negative    Urobilinogen Urine      0.2, 1.0 E.U./dL 0.2    Nitrite Urine      Negative  Negative    Leukocyte Esterase Urine      Negative  Negative    Bacteria Urine      None Seen /HPF Few !    RBC Urine      0-2 /HPF /HPF 0-2    WBC Urine      0-5 /HPF /HPF None Seen    N Gonorrhea PCR      Negative  Negative    Neisseria gonorrhoeae Specimen Source Urine, Voided    Chlamydia Trachomatis PCR      Negative  Negative    Chlamydia trachomatis Specimen Source Urine, Voided        Reviewed post-vasectomy semen analysis that showed azoospermia Oct 2024    ASSESSMEN/PLAN:   Resolved left testis pain, likely related to large left sperm granuloma.  Perhaps this got irritated or infected.  Also minor testicular microlithiasis see on the scrotal ultrasound.  He does have history of undescended testis.  Discussed UDT and perhaps testicular microlithiasis impart increased risk of testis cancer.  Testicular self exam advised monthly for all of the above.  If the sperm granuloma is tender again in the future discussed that it could be surgically removed.  Would not recommend serial scrotal ultrasound for sperm granuloma.  I'm happy to see him back in the future as needed.       Copied cc to Consulting provider  Adelfo Best , copy Dr. Harmeet Swann     Thank-you for the kind consultation.  Yoel Chavarria MD     Urological Surgeon       --------------------------------------------------------------------------------------------------------------------   Additional Coding Information:    Problems:  3 -- one acute uncomplicated illness or injury    Data  Reviewed  3 or more studies reviewed, as listed above     Tests ordered/pending: N/A     Level of risk:  3 -- low risk (e.g., OTC medication or observation, minor surgery without risks)    Time spent:  18 minutes spent on the date of the encounter doing chart review, history and exam, documentation and further activities per the note

## 2025-04-09 NOTE — NURSING NOTE
Tony Ortega's goals for this visit include:   Chief Complaint   Patient presents with    New Patient     Left testicular pain [N50.812]  Testicular microlithiasis          He requests these members of his care team be copied on today's visit information:       PCP: Jasmina Burrows    Referring Provider:  Adelfo Best MD  [address] [place] [address]    There were no vitals taken for this visit.    Do you need any medication refills at today's visit?     Shae Hagen LPN on 4/9/2025 at 1:47 PM